# Patient Record
Sex: MALE | Race: WHITE | NOT HISPANIC OR LATINO
[De-identification: names, ages, dates, MRNs, and addresses within clinical notes are randomized per-mention and may not be internally consistent; named-entity substitution may affect disease eponyms.]

---

## 2018-02-01 PROBLEM — Z00.00 ENCOUNTER FOR PREVENTIVE HEALTH EXAMINATION: Status: ACTIVE | Noted: 2018-02-01

## 2018-02-08 ENCOUNTER — APPOINTMENT (OUTPATIENT)
Dept: CARDIOLOGY | Facility: CLINIC | Age: 58
End: 2018-02-08
Payer: COMMERCIAL

## 2018-02-08 ENCOUNTER — NON-APPOINTMENT (OUTPATIENT)
Age: 58
End: 2018-02-08

## 2018-02-08 VITALS
BODY MASS INDEX: 32.13 KG/M2 | SYSTOLIC BLOOD PRESSURE: 148 MMHG | OXYGEN SATURATION: 98 % | HEART RATE: 85 BPM | WEIGHT: 212 LBS | DIASTOLIC BLOOD PRESSURE: 90 MMHG | HEIGHT: 68 IN

## 2018-02-08 DIAGNOSIS — Z87.898 PERSONAL HISTORY OF OTHER SPECIFIED CONDITIONS: ICD-10-CM

## 2018-02-08 DIAGNOSIS — Z82.49 FAMILY HISTORY OF ISCHEMIC HEART DISEASE AND OTHER DISEASES OF THE CIRCULATORY SYSTEM: ICD-10-CM

## 2018-02-08 DIAGNOSIS — Z87.891 PERSONAL HISTORY OF NICOTINE DEPENDENCE: ICD-10-CM

## 2018-02-08 PROCEDURE — 99204 OFFICE O/P NEW MOD 45 MIN: CPT

## 2018-02-08 PROCEDURE — 93306 TTE W/DOPPLER COMPLETE: CPT

## 2018-02-08 PROCEDURE — 93000 ELECTROCARDIOGRAM COMPLETE: CPT

## 2018-02-08 RX ORDER — ROSUVASTATIN CALCIUM 10 MG/1
10 TABLET, FILM COATED ORAL DAILY
Refills: 0 | Status: ACTIVE | COMMUNITY

## 2018-03-01 ENCOUNTER — APPOINTMENT (OUTPATIENT)
Dept: OPHTHALMOLOGY | Facility: CLINIC | Age: 58
End: 2018-03-01
Payer: COMMERCIAL

## 2018-03-01 ENCOUNTER — APPOINTMENT (OUTPATIENT)
Dept: CARDIOLOGY | Facility: CLINIC | Age: 58
End: 2018-03-01
Payer: COMMERCIAL

## 2018-03-01 VITALS
HEART RATE: 96 BPM | HEIGHT: 68 IN | OXYGEN SATURATION: 98 % | DIASTOLIC BLOOD PRESSURE: 84 MMHG | BODY MASS INDEX: 31.98 KG/M2 | WEIGHT: 211 LBS | SYSTOLIC BLOOD PRESSURE: 128 MMHG

## 2018-03-01 PROCEDURE — 92004 COMPRE OPH EXAM NEW PT 1/>: CPT

## 2018-03-01 PROCEDURE — 99214 OFFICE O/P EST MOD 30 MIN: CPT

## 2018-03-19 ENCOUNTER — OUTPATIENT (OUTPATIENT)
Dept: OUTPATIENT SERVICES | Facility: HOSPITAL | Age: 58
LOS: 1 days | End: 2018-03-19
Payer: COMMERCIAL

## 2018-03-19 ENCOUNTER — APPOINTMENT (OUTPATIENT)
Dept: CARDIOLOGY | Facility: CLINIC | Age: 58
End: 2018-03-19

## 2018-03-19 DIAGNOSIS — Z00.8 ENCOUNTER FOR OTHER GENERAL EXAMINATION: ICD-10-CM

## 2018-03-19 PROCEDURE — 93351 STRESS TTE COMPLETE: CPT

## 2018-03-19 PROCEDURE — 93325 DOPPLER ECHO COLOR FLOW MAPG: CPT

## 2018-03-19 PROCEDURE — 93325 DOPPLER ECHO COLOR FLOW MAPG: CPT | Mod: 26

## 2018-03-19 PROCEDURE — 93016 CV STRESS TEST SUPVJ ONLY: CPT

## 2018-03-19 PROCEDURE — 93017 CV STRESS TEST TRACING ONLY: CPT

## 2018-03-19 PROCEDURE — 93320 DOPPLER ECHO COMPLETE: CPT | Mod: 26

## 2018-03-19 PROCEDURE — 93320 DOPPLER ECHO COMPLETE: CPT

## 2018-03-19 PROCEDURE — 93018 CV STRESS TEST I&R ONLY: CPT

## 2018-03-19 PROCEDURE — 93350 STRESS TTE ONLY: CPT | Mod: 26

## 2018-04-17 ENCOUNTER — MEDICATION RENEWAL (OUTPATIENT)
Age: 58
End: 2018-04-17

## 2018-09-06 ENCOUNTER — APPOINTMENT (OUTPATIENT)
Dept: CARDIOLOGY | Facility: CLINIC | Age: 58
End: 2018-09-06
Payer: COMMERCIAL

## 2018-09-06 ENCOUNTER — NON-APPOINTMENT (OUTPATIENT)
Age: 58
End: 2018-09-06

## 2018-09-06 VITALS
SYSTOLIC BLOOD PRESSURE: 150 MMHG | DIASTOLIC BLOOD PRESSURE: 91 MMHG | HEART RATE: 95 BPM | OXYGEN SATURATION: 98 % | WEIGHT: 212.25 LBS | BODY MASS INDEX: 32.17 KG/M2 | HEIGHT: 68 IN

## 2018-09-06 PROCEDURE — 93000 ELECTROCARDIOGRAM COMPLETE: CPT

## 2018-09-06 PROCEDURE — 99214 OFFICE O/P EST MOD 30 MIN: CPT

## 2018-10-04 ENCOUNTER — MEDICATION RENEWAL (OUTPATIENT)
Age: 58
End: 2018-10-04

## 2018-10-09 ENCOUNTER — APPOINTMENT (OUTPATIENT)
Dept: CARDIOLOGY | Facility: CLINIC | Age: 58
End: 2018-10-09
Payer: COMMERCIAL

## 2018-10-09 VITALS
OXYGEN SATURATION: 99 % | HEART RATE: 77 BPM | BODY MASS INDEX: 32.17 KG/M2 | HEIGHT: 68 IN | SYSTOLIC BLOOD PRESSURE: 132 MMHG | WEIGHT: 212.25 LBS | DIASTOLIC BLOOD PRESSURE: 82 MMHG

## 2018-10-09 PROCEDURE — 99213 OFFICE O/P EST LOW 20 MIN: CPT

## 2019-02-06 ENCOUNTER — APPOINTMENT (OUTPATIENT)
Dept: CARDIOLOGY | Facility: CLINIC | Age: 59
End: 2019-02-06
Payer: COMMERCIAL

## 2019-02-06 ENCOUNTER — NON-APPOINTMENT (OUTPATIENT)
Age: 59
End: 2019-02-06

## 2019-02-06 VITALS
OXYGEN SATURATION: 94 % | BODY MASS INDEX: 32.58 KG/M2 | WEIGHT: 215 LBS | HEIGHT: 68 IN | SYSTOLIC BLOOD PRESSURE: 150 MMHG | DIASTOLIC BLOOD PRESSURE: 80 MMHG | HEART RATE: 79 BPM

## 2019-02-06 VITALS — SYSTOLIC BLOOD PRESSURE: 146 MMHG | DIASTOLIC BLOOD PRESSURE: 80 MMHG

## 2019-02-06 PROCEDURE — 99214 OFFICE O/P EST MOD 30 MIN: CPT

## 2019-02-06 PROCEDURE — 93000 ELECTROCARDIOGRAM COMPLETE: CPT

## 2019-02-06 NOTE — REVIEW OF SYSTEMS
[Negative] : Endocrine [Shortness Of Breath] : no shortness of breath [Chest Pain] : no chest pain [Lower Ext Edema] : no extremity edema [Palpitations] : no palpitations [Easy Bleeding] : no tendency for easy bleeding [Easy Bruising] : no tendency for easy bruising

## 2019-02-06 NOTE — HISTORY OF PRESENT ILLNESS
[FreeTextEntry1] : Ricardo Lucia is a 58 year old male with history of hypertension and hypercholesterolemia comes for follow up visit. Denies any chest pain or palpitations. No shortness of breath on exertion. Working long hours. Compliant to medications and diet.

## 2019-02-06 NOTE — PHYSICAL EXAM
[General Appearance - Well Developed] : well developed [Normal Appearance] : normal appearance [Well Groomed] : well groomed [General Appearance - Well Nourished] : well nourished [No Deformities] : no deformities [General Appearance - In No Acute Distress] : no acute distress [Normal Conjunctiva] : the conjunctiva exhibited no abnormalities [Eyelids - No Xanthelasma] : the eyelids demonstrated no xanthelasmas [Normal Oral Mucosa] : normal oral mucosa [No Oral Pallor] : no oral pallor [No Oral Cyanosis] : no oral cyanosis [Auscultation Breath Sounds / Voice Sounds] : lungs were clear to auscultation bilaterally [Heart Rate And Rhythm] : heart rate and rhythm were normal [Heart Sounds] : normal S1 and S2 [Murmurs] : no murmurs present [Arterial Pulses Normal] : the arterial pulses were normal [Edema] : no peripheral edema present [Bowel Sounds] : normal bowel sounds [Abdomen Soft] : soft [Abdomen Tenderness] : non-tender [Abnormal Walk] : normal gait [Nail Clubbing] : no clubbing of the fingernails [Cyanosis, Localized] : no localized cyanosis [Skin Color & Pigmentation] : normal skin color and pigmentation [Skin Turgor] : normal skin turgor [] : no rash [Oriented To Time, Place, And Person] : oriented to person, place, and time [Impaired Insight] : insight and judgment were intact [No Anxiety] : not feeling anxious [FreeTextEntry1] : No JVD

## 2019-03-06 ENCOUNTER — MEDICATION RENEWAL (OUTPATIENT)
Age: 59
End: 2019-03-06

## 2019-08-07 ENCOUNTER — APPOINTMENT (OUTPATIENT)
Dept: CARDIOLOGY | Facility: CLINIC | Age: 59
End: 2019-08-07
Payer: COMMERCIAL

## 2019-08-07 ENCOUNTER — NON-APPOINTMENT (OUTPATIENT)
Age: 59
End: 2019-08-07

## 2019-08-07 VITALS
HEART RATE: 86 BPM | BODY MASS INDEX: 31.83 KG/M2 | WEIGHT: 210 LBS | OXYGEN SATURATION: 96 % | DIASTOLIC BLOOD PRESSURE: 80 MMHG | HEIGHT: 68 IN | SYSTOLIC BLOOD PRESSURE: 120 MMHG

## 2019-08-07 PROCEDURE — 99214 OFFICE O/P EST MOD 30 MIN: CPT

## 2019-08-07 PROCEDURE — 93000 ELECTROCARDIOGRAM COMPLETE: CPT

## 2019-08-07 NOTE — HISTORY OF PRESENT ILLNESS
[FreeTextEntry1] : Ricardo Lucia is a 58 year old male with history of hypertension and hypercholesterolemia comes for follow up visit. No chest pains or shortness of breath on exertion. No palpitations. Compliant to medications and diet. Follows up with PCP.

## 2019-08-07 NOTE — DISCUSSION/SUMMARY
[FreeTextEntry1] : In a summary Mr. Islasdana Ricardo is a middle aged male with hypertension, controlled. Continue current medications and 2 gm sodium diet. Hypercholesterolemia, not taking statins as he does not feel good. Explained him to follow up with PCP, get Lipid profile . Healthy lifestyle. Follow up in 6 months.

## 2019-08-07 NOTE — PHYSICAL EXAM
[General Appearance - Well Developed] : well developed [Normal Appearance] : normal appearance [General Appearance - Well Nourished] : well nourished [Well Groomed] : well groomed [No Deformities] : no deformities [General Appearance - In No Acute Distress] : no acute distress [Eyelids - No Xanthelasma] : the eyelids demonstrated no xanthelasmas [Normal Conjunctiva] : the conjunctiva exhibited no abnormalities [Normal Oral Mucosa] : normal oral mucosa [No Oral Cyanosis] : no oral cyanosis [No Oral Pallor] : no oral pallor [Auscultation Breath Sounds / Voice Sounds] : lungs were clear to auscultation bilaterally [Heart Rate And Rhythm] : heart rate and rhythm were normal [Heart Sounds] : normal S1 and S2 [Murmurs] : no murmurs present [Edema] : no peripheral edema present [Arterial Pulses Normal] : the arterial pulses were normal [Bowel Sounds] : normal bowel sounds [Abdomen Soft] : soft [Abdomen Tenderness] : non-tender [Nail Clubbing] : no clubbing of the fingernails [Abnormal Walk] : normal gait [Cyanosis, Localized] : no localized cyanosis [Skin Color & Pigmentation] : normal skin color and pigmentation [Skin Turgor] : normal skin turgor [] : no rash [Impaired Insight] : insight and judgment were intact [Oriented To Time, Place, And Person] : oriented to person, place, and time [No Anxiety] : not feeling anxious [FreeTextEntry1] : No JVD

## 2019-08-09 ENCOUNTER — MEDICATION RENEWAL (OUTPATIENT)
Age: 59
End: 2019-08-09

## 2020-02-05 ENCOUNTER — APPOINTMENT (OUTPATIENT)
Dept: CARDIOLOGY | Facility: CLINIC | Age: 60
End: 2020-02-05
Payer: COMMERCIAL

## 2020-02-05 ENCOUNTER — NON-APPOINTMENT (OUTPATIENT)
Age: 60
End: 2020-02-05

## 2020-02-05 VITALS
BODY MASS INDEX: 32.28 KG/M2 | SYSTOLIC BLOOD PRESSURE: 130 MMHG | HEART RATE: 81 BPM | OXYGEN SATURATION: 97 % | WEIGHT: 213 LBS | HEIGHT: 68 IN | DIASTOLIC BLOOD PRESSURE: 72 MMHG

## 2020-02-05 PROCEDURE — 93306 TTE W/DOPPLER COMPLETE: CPT

## 2020-02-05 PROCEDURE — 99214 OFFICE O/P EST MOD 30 MIN: CPT

## 2020-02-05 PROCEDURE — 93000 ELECTROCARDIOGRAM COMPLETE: CPT

## 2020-02-05 NOTE — PHYSICAL EXAM
[Normal Appearance] : normal appearance [General Appearance - Well Developed] : well developed [Well Groomed] : well groomed [General Appearance - Well Nourished] : well nourished [No Deformities] : no deformities [General Appearance - In No Acute Distress] : no acute distress [Normal Conjunctiva] : the conjunctiva exhibited no abnormalities [Eyelids - No Xanthelasma] : the eyelids demonstrated no xanthelasmas [No Oral Cyanosis] : no oral cyanosis [No Oral Pallor] : no oral pallor [Normal Oral Mucosa] : normal oral mucosa [Auscultation Breath Sounds / Voice Sounds] : lungs were clear to auscultation bilaterally [Murmurs] : no murmurs present [Heart Sounds] : normal S1 and S2 [Heart Rate And Rhythm] : heart rate and rhythm were normal [Abdomen Soft] : soft [Bowel Sounds] : normal bowel sounds [Arterial Pulses Normal] : the arterial pulses were normal [Edema] : no peripheral edema present [Abdomen Tenderness] : non-tender [Abnormal Walk] : normal gait [Nail Clubbing] : no clubbing of the fingernails [Cyanosis, Localized] : no localized cyanosis [Skin Turgor] : normal skin turgor [Skin Color & Pigmentation] : normal skin color and pigmentation [] : no rash [Oriented To Time, Place, And Person] : oriented to person, place, and time [Impaired Insight] : insight and judgment were intact [No Anxiety] : not feeling anxious [FreeTextEntry1] : No JVD

## 2020-02-05 NOTE — DISCUSSION/SUMMARY
[FreeTextEntry1] : In a summary Ricardo Lucia is a middle aged male with hypertension, controlled. Continue current medications and 2 gm sodium diet. Echo done showed normal LV systolic function. Hypercholesterolemia, on statins and low cholesterol diet. Healthy lifestyle. Follow up in 6 months.

## 2020-02-05 NOTE — HISTORY OF PRESENT ILLNESS
[FreeTextEntry1] : Ricardo Lucia is a 59 year old male with history of hypertension and hypercholesterolemia comes for follow up visit. Denies any chest pain or palpitations. No shortness of breath on exertion. Compliant to medications and diet. Physically active. Follows up with PCP.

## 2020-08-06 ENCOUNTER — APPOINTMENT (OUTPATIENT)
Dept: CARDIOLOGY | Facility: CLINIC | Age: 60
End: 2020-08-06
Payer: COMMERCIAL

## 2020-08-06 ENCOUNTER — NON-APPOINTMENT (OUTPATIENT)
Age: 60
End: 2020-08-06

## 2020-08-06 VITALS
HEIGHT: 68 IN | SYSTOLIC BLOOD PRESSURE: 132 MMHG | WEIGHT: 207 LBS | HEART RATE: 87 BPM | OXYGEN SATURATION: 98 % | DIASTOLIC BLOOD PRESSURE: 82 MMHG | BODY MASS INDEX: 31.37 KG/M2

## 2020-08-06 PROCEDURE — 99214 OFFICE O/P EST MOD 30 MIN: CPT

## 2020-08-06 PROCEDURE — 93000 ELECTROCARDIOGRAM COMPLETE: CPT

## 2020-08-06 RX ORDER — PNV NO.95/FERROUS FUM/FOLIC AC 28MG-0.8MG
325 TABLET ORAL DAILY
Refills: 0 | Status: ACTIVE | COMMUNITY

## 2020-08-06 NOTE — HISTORY OF PRESENT ILLNESS
[FreeTextEntry1] : Ricardo Lucia is a 59 year old male with history of hypertension and hypercholesterolemia comes for follow up visit. Denies any chest pain or palpitations. No shortness of breath on exertion. Compliant to medications and diet. Follows up with PCP. Physically active.

## 2020-08-06 NOTE — DISCUSSION/SUMMARY
[FreeTextEntry1] : In a summary Mr. Islasdana Ricardo is a middle aged male with hypertension, controlled. Continue current medications and low cholesterol diet. Hypercholesterolemia, on statins and low cholesterol diet. LDL goal less than 130 g/dL. Continue healthy lifestyle. Follow up in 6 months.

## 2020-08-06 NOTE — PHYSICAL EXAM
[General Appearance - Well Developed] : well developed [Normal Appearance] : normal appearance [Well Groomed] : well groomed [General Appearance - Well Nourished] : well nourished [No Deformities] : no deformities [General Appearance - In No Acute Distress] : no acute distress [Normal Conjunctiva] : the conjunctiva exhibited no abnormalities [Normal Oral Mucosa] : normal oral mucosa [Eyelids - No Xanthelasma] : the eyelids demonstrated no xanthelasmas [No Oral Pallor] : no oral pallor [No Oral Cyanosis] : no oral cyanosis [Auscultation Breath Sounds / Voice Sounds] : lungs were clear to auscultation bilaterally [Heart Sounds] : normal S1 and S2 [Heart Rate And Rhythm] : heart rate and rhythm were normal [Murmurs] : no murmurs present [Arterial Pulses Normal] : the arterial pulses were normal [Bowel Sounds] : normal bowel sounds [Abdomen Soft] : soft [Edema] : no peripheral edema present [Abdomen Tenderness] : non-tender [Abnormal Walk] : normal gait [Cyanosis, Localized] : no localized cyanosis [Nail Clubbing] : no clubbing of the fingernails [Skin Turgor] : normal skin turgor [] : no rash [Skin Color & Pigmentation] : normal skin color and pigmentation [Impaired Insight] : insight and judgment were intact [Oriented To Time, Place, And Person] : oriented to person, place, and time [No Anxiety] : not feeling anxious [FreeTextEntry1] : No JVD

## 2021-02-08 ENCOUNTER — NON-APPOINTMENT (OUTPATIENT)
Age: 61
End: 2021-02-08

## 2021-02-08 ENCOUNTER — APPOINTMENT (OUTPATIENT)
Dept: CARDIOLOGY | Facility: CLINIC | Age: 61
End: 2021-02-08
Payer: COMMERCIAL

## 2021-02-08 VITALS
WEIGHT: 216 LBS | DIASTOLIC BLOOD PRESSURE: 86 MMHG | SYSTOLIC BLOOD PRESSURE: 138 MMHG | HEART RATE: 97 BPM | OXYGEN SATURATION: 97 % | BODY MASS INDEX: 32.74 KG/M2 | TEMPERATURE: 98.5 F | HEIGHT: 68 IN

## 2021-02-08 PROCEDURE — 93000 ELECTROCARDIOGRAM COMPLETE: CPT

## 2021-02-08 PROCEDURE — 99072 ADDL SUPL MATRL&STAF TM PHE: CPT

## 2021-02-08 PROCEDURE — 99214 OFFICE O/P EST MOD 30 MIN: CPT

## 2021-02-08 PROCEDURE — 93306 TTE W/DOPPLER COMPLETE: CPT

## 2021-02-08 NOTE — DISCUSSION/SUMMARY
[FreeTextEntry1] : In a summary Ricardo Lucia is a middle aged male with hypertension, controlled. Continue current medications and 2 gm sodium diet. Hypertension, and heart murmur, echo done showed normal LV systolic function and mild TR. Hypercholesterolemia, on statins and low cholesterol diet. LDL goal less than 130 g/dL. Sinus tachycardia may be secondary to pain. Monitor heart rate. Healthy lifestyle. Explained Mr. Conde in detail. Follow up in 6 months.

## 2021-02-08 NOTE — PHYSICAL EXAM
[General Appearance - Well Developed] : well developed [Normal Appearance] : normal appearance [Well Groomed] : well groomed [No Deformities] : no deformities [General Appearance - Well Nourished] : well nourished [General Appearance - In No Acute Distress] : no acute distress [Normal Conjunctiva] : the conjunctiva exhibited no abnormalities [Eyelids - No Xanthelasma] : the eyelids demonstrated no xanthelasmas [Normal Oral Mucosa] : normal oral mucosa [No Oral Pallor] : no oral pallor [No Oral Cyanosis] : no oral cyanosis [Auscultation Breath Sounds / Voice Sounds] : lungs were clear to auscultation bilaterally [Heart Rate And Rhythm] : heart rate and rhythm were normal [Heart Sounds] : normal S1 and S2 [Arterial Pulses Normal] : the arterial pulses were normal [Edema] : no peripheral edema present [Bowel Sounds] : normal bowel sounds [Abdomen Soft] : soft [Abdomen Tenderness] : non-tender [Abnormal Walk] : normal gait [Nail Clubbing] : no clubbing of the fingernails [Cyanosis, Localized] : no localized cyanosis [Skin Color & Pigmentation] : normal skin color and pigmentation [] : no rash [Skin Turgor] : normal skin turgor [Oriented To Time, Place, And Person] : oriented to person, place, and time [Impaired Insight] : insight and judgment were intact [No Anxiety] : not feeling anxious [FreeTextEntry1] : 2/6 PSM at left sternal border.

## 2021-02-08 NOTE — HISTORY OF PRESENT ILLNESS
[FreeTextEntry1] : Ricardo Lucia is a 60 year old male with history of hypertension and hypercholesterolemia comes for follow up visit. Denies any chest pain on exertion or palpitations. No shortness of breath on exertion. 10 days ago fell on his right side while playing Tennis and still have right sided mild chest pain though improving. Compliant to medications and diet. Physically not much active because of pandemic.

## 2021-02-08 NOTE — REASON FOR VISIT
[Follow-Up - Clinic] : a clinic follow-up of [Hyperlipidemia] : hyperlipidemia [Hypertension] : hypertension [FreeTextEntry1] : sinus tachycardia.

## 2021-08-11 ENCOUNTER — NON-APPOINTMENT (OUTPATIENT)
Age: 61
End: 2021-08-11

## 2021-08-11 ENCOUNTER — APPOINTMENT (OUTPATIENT)
Dept: CARDIOLOGY | Facility: CLINIC | Age: 61
End: 2021-08-11
Payer: COMMERCIAL

## 2021-08-11 VITALS
WEIGHT: 209 LBS | HEART RATE: 91 BPM | TEMPERATURE: 97.7 F | BODY MASS INDEX: 31.67 KG/M2 | DIASTOLIC BLOOD PRESSURE: 80 MMHG | OXYGEN SATURATION: 96 % | HEIGHT: 68 IN | SYSTOLIC BLOOD PRESSURE: 126 MMHG

## 2021-08-11 DIAGNOSIS — Z86.79 PERSONAL HISTORY OF OTHER DISEASES OF THE CIRCULATORY SYSTEM: ICD-10-CM

## 2021-08-11 PROCEDURE — 99214 OFFICE O/P EST MOD 30 MIN: CPT

## 2021-08-11 PROCEDURE — 93000 ELECTROCARDIOGRAM COMPLETE: CPT

## 2021-08-11 RX ORDER — TRAZODONE HYDROCHLORIDE 50 MG/1
50 TABLET ORAL
Refills: 0 | Status: ACTIVE | COMMUNITY

## 2021-08-11 NOTE — DISCUSSION/SUMMARY
[FreeTextEntry1] : In a summary  Chemoyayodana Ricardo is a middle aged male with hypertension, controlled. Continue current medications and 2 gm sodium diet. Hypercholesterolemia, on statins and low cholesterol diet. LDL goal less than 130 g/dL. Continue healthy lifestyle. Follow up in 6 months.

## 2021-08-11 NOTE — REVIEW OF SYSTEMS
[Under Stress] : under stress [Negative] : Respiratory [Blurry Vision] : no blurred vision [Dyspnea on exertion] : not dyspnea during exertion [Chest Discomfort] : no chest discomfort [Lower Ext Edema] : no extremity edema [Palpitations] : no palpitations [Orthopnea] : no orthopnea [PND] : no PND [Abdominal Pain] : no abdominal pain [Nausea] : no nausea [Vomiting] : no vomiting [Heartburn] : no heartburn [Joint Pain] : no joint pain [Rash] : no rash [Itching] : no itching [Dizziness] : no dizziness [Tremor] : no tremor was seen [Numbness (Hypoesthesia)] : no numbness [Tingling (Paresthesia)] : no tingling [Confusion] : no confusion was observed [Easy Bleeding] : no tendency for easy bleeding [Easy Bruising] : no tendency for easy bruising

## 2021-08-11 NOTE — HISTORY OF PRESENT ILLNESS
[FreeTextEntry1] : Ricardo Lucia is a 60 year old male with history of hypertension and hypercholesterolemia comes for follow up visit. Denies any chest pain or shortness of breath on exertion. No palpitations. Compliant to medications and diet. Started walking about 1 hour almost every day and feeling much better. Lost about 5 lbs.

## 2021-12-16 ENCOUNTER — APPOINTMENT (OUTPATIENT)
Dept: UROLOGY | Facility: CLINIC | Age: 61
End: 2021-12-16
Payer: COMMERCIAL

## 2021-12-16 VITALS
HEIGHT: 68 IN | SYSTOLIC BLOOD PRESSURE: 151 MMHG | WEIGHT: 185 LBS | DIASTOLIC BLOOD PRESSURE: 77 MMHG | RESPIRATION RATE: 17 BRPM | TEMPERATURE: 98 F | HEART RATE: 82 BPM | BODY MASS INDEX: 28.04 KG/M2

## 2021-12-16 DIAGNOSIS — R97.20 ELEVATED PROSTATE, SPECIFIC ANTIGEN [PSA]: ICD-10-CM

## 2021-12-16 PROCEDURE — 99204 OFFICE O/P NEW MOD 45 MIN: CPT

## 2021-12-16 PROCEDURE — 51798 US URINE CAPACITY MEASURE: CPT

## 2021-12-20 ENCOUNTER — NON-APPOINTMENT (OUTPATIENT)
Age: 61
End: 2021-12-20

## 2021-12-20 LAB
APPEARANCE: CLEAR
BACTERIA UR CULT: NORMAL
BACTERIA: NEGATIVE
BILIRUBIN URINE: NEGATIVE
BLOOD URINE: NEGATIVE
COLOR: YELLOW
GLUCOSE QUALITATIVE U: NEGATIVE
HYALINE CASTS: 0 /LPF
KETONES URINE: NEGATIVE
LEUKOCYTE ESTERASE URINE: NEGATIVE
MICROSCOPIC-UA: NORMAL
NITRITE URINE: NEGATIVE
PH URINE: 6.5
PROTEIN URINE: NORMAL
RED BLOOD CELLS URINE: 2 /HPF
SPECIFIC GRAVITY URINE: 1.03
SQUAMOUS EPITHELIAL CELLS: 1 /HPF
UROBILINOGEN URINE: ABNORMAL
WHITE BLOOD CELLS URINE: 1 /HPF

## 2021-12-22 LAB
PSA FREE FLD-MCNC: 15 %
PSA FREE SERPL-MCNC: 0.53 NG/ML
PSA SERPL-MCNC: 3.46 NG/ML

## 2022-01-05 ENCOUNTER — APPOINTMENT (OUTPATIENT)
Dept: MRI IMAGING | Facility: IMAGING CENTER | Age: 62
End: 2022-01-05

## 2022-02-09 ENCOUNTER — NON-APPOINTMENT (OUTPATIENT)
Age: 62
End: 2022-02-09

## 2022-02-09 ENCOUNTER — APPOINTMENT (OUTPATIENT)
Dept: CARDIOLOGY | Facility: CLINIC | Age: 62
End: 2022-02-09
Payer: COMMERCIAL

## 2022-02-09 VITALS
DIASTOLIC BLOOD PRESSURE: 82 MMHG | WEIGHT: 191 LBS | BODY MASS INDEX: 28.95 KG/M2 | TEMPERATURE: 98.6 F | OXYGEN SATURATION: 96 % | HEART RATE: 98 BPM | HEIGHT: 68 IN | SYSTOLIC BLOOD PRESSURE: 136 MMHG

## 2022-02-09 PROCEDURE — 99214 OFFICE O/P EST MOD 30 MIN: CPT

## 2022-02-09 PROCEDURE — 93000 ELECTROCARDIOGRAM COMPLETE: CPT

## 2022-02-09 NOTE — DISCUSSION/SUMMARY
[FreeTextEntry1] : In a summary Ricardo Lucia is a middle aged male with hypertension, controlled. Continue Amlodipine, Micardis  and 2 gm sodium diet. Hypercholesterolemia, Continue Crestor  and low cholesterol diet. LDL goal less than 130 g/dL. Chest tightness and shortness of breath on Jogging, will get Echo to assess LV systolic function and wall motion. Further plan based on Echo results.  Continue healthy lifestyle.

## 2022-02-09 NOTE — HISTORY OF PRESENT ILLNESS
[FreeTextEntry1] : Ricardo Lucia is a 61 year old male with history of hypertension and hypercholesterolemia comes for follow up visit. Complaining of mild  chest tightness along with   shortness of breath on exertion especially on jogging which is nonradiating and relieved with rest in about 5 minutes of few weeks duration. No palpitations. Compliant to medications and diet. Started walking every day.

## 2022-02-09 NOTE — REASON FOR VISIT
[Hyperlipidemia] : hyperlipidemia [Hypertension] : hypertension [Other: ____] : [unfilled] [FreeTextEntry3] : Dr. Isabel

## 2022-02-09 NOTE — REVIEW OF SYSTEMS
[Dyspnea on exertion] : dyspnea during exertion [Under Stress] : under stress [Negative] : Respiratory [Blurry Vision] : no blurred vision [Chest Discomfort] : no chest discomfort [Lower Ext Edema] : no extremity edema [Palpitations] : no palpitations [Orthopnea] : no orthopnea [PND] : no PND [Abdominal Pain] : no abdominal pain [Nausea] : no nausea [Vomiting] : no vomiting [Heartburn] : no heartburn [Joint Pain] : no joint pain [Rash] : no rash [Itching] : no itching [Dizziness] : no dizziness [Tremor] : no tremor was seen [Numbness (Hypoesthesia)] : no numbness [Tingling (Paresthesia)] : no tingling [Confusion] : no confusion was observed [Easy Bleeding] : no tendency for easy bleeding [Easy Bruising] : no tendency for easy bruising [FreeTextEntry5] : chest tightness.

## 2022-02-23 ENCOUNTER — APPOINTMENT (OUTPATIENT)
Dept: CARDIOLOGY | Facility: CLINIC | Age: 62
End: 2022-02-23
Payer: COMMERCIAL

## 2022-02-23 PROCEDURE — 93306 TTE W/DOPPLER COMPLETE: CPT

## 2022-03-03 ENCOUNTER — APPOINTMENT (OUTPATIENT)
Dept: CARDIOLOGY | Facility: CLINIC | Age: 62
End: 2022-03-03
Payer: COMMERCIAL

## 2022-03-03 DIAGNOSIS — R07.89 OTHER CHEST PAIN: ICD-10-CM

## 2022-03-03 PROCEDURE — 93351 STRESS TTE COMPLETE: CPT

## 2022-03-03 RX ORDER — ASPIRIN ENTERIC COATED TABLETS 81 MG 81 MG/1
81 TABLET, DELAYED RELEASE ORAL DAILY
Refills: 0 | Status: ACTIVE | COMMUNITY

## 2022-03-09 ENCOUNTER — OUTPATIENT (OUTPATIENT)
Dept: OUTPATIENT SERVICES | Facility: HOSPITAL | Age: 62
LOS: 1 days | Discharge: HOME | End: 2022-03-09
Payer: COMMERCIAL

## 2022-03-09 LAB
ANION GAP SERPL CALC-SCNC: 10 MMOL/L — SIGNIFICANT CHANGE UP (ref 7–14)
BUN SERPL-MCNC: 17 MG/DL — SIGNIFICANT CHANGE UP (ref 7–23)
CALCIUM SERPL-MCNC: 8.9 MG/DL — SIGNIFICANT CHANGE UP (ref 8.4–10.5)
CHLORIDE SERPL-SCNC: 104 MMOL/L — SIGNIFICANT CHANGE UP (ref 98–107)
CO2 SERPL-SCNC: 27 MMOL/L — SIGNIFICANT CHANGE UP (ref 22–31)
CREAT SERPL-MCNC: 0.74 MG/DL — SIGNIFICANT CHANGE UP (ref 0.5–1.3)
EGFR: 104 ML/MIN/1.73M2 — SIGNIFICANT CHANGE UP
GLUCOSE SERPL-MCNC: 108 MG/DL — HIGH (ref 70–99)
HCT VFR BLD CALC: 40 % — SIGNIFICANT CHANGE UP (ref 39–50)
HGB BLD-MCNC: 13.8 G/DL — SIGNIFICANT CHANGE UP (ref 13–17)
MCHC RBC-ENTMCNC: 30.2 PG — SIGNIFICANT CHANGE UP (ref 27–34)
MCHC RBC-ENTMCNC: 34.5 GM/DL — SIGNIFICANT CHANGE UP (ref 32–36)
MCV RBC AUTO: 87.5 FL — SIGNIFICANT CHANGE UP (ref 80–100)
NRBC # BLD: 0 /100 WBCS — SIGNIFICANT CHANGE UP
NRBC # FLD: 0 K/UL — SIGNIFICANT CHANGE UP
PLATELET # BLD AUTO: 250 K/UL — SIGNIFICANT CHANGE UP (ref 150–400)
POTASSIUM SERPL-MCNC: 4 MMOL/L — SIGNIFICANT CHANGE UP (ref 3.5–5.3)
POTASSIUM SERPL-SCNC: 4 MMOL/L — SIGNIFICANT CHANGE UP (ref 3.5–5.3)
RBC # BLD: 4.57 M/UL — SIGNIFICANT CHANGE UP (ref 4.2–5.8)
RBC # FLD: 12.5 % — SIGNIFICANT CHANGE UP (ref 10.3–14.5)
SODIUM SERPL-SCNC: 141 MMOL/L — SIGNIFICANT CHANGE UP (ref 135–145)
WBC # BLD: 8.15 K/UL — SIGNIFICANT CHANGE UP (ref 3.8–10.5)
WBC # FLD AUTO: 8.15 K/UL — SIGNIFICANT CHANGE UP (ref 3.8–10.5)

## 2022-03-09 PROCEDURE — 93454 CORONARY ARTERY ANGIO S&I: CPT | Mod: 26

## 2022-03-09 PROCEDURE — 93571 IV DOP VEL&/PRESS C FLO 1ST: CPT | Mod: 26,LM

## 2022-03-09 PROCEDURE — 93010 ELECTROCARDIOGRAM REPORT: CPT

## 2022-03-09 PROCEDURE — 99152 MOD SED SAME PHYS/QHP 5/>YRS: CPT

## 2022-03-09 RX ORDER — METOPROLOL TARTRATE 50 MG
1 TABLET ORAL
Qty: 0 | Refills: 0 | DISCHARGE

## 2022-03-09 RX ORDER — ISOSORBIDE MONONITRATE 60 MG/1
1 TABLET, EXTENDED RELEASE ORAL
Qty: 30 | Refills: 0
Start: 2022-03-09 | End: 2022-04-07

## 2022-03-09 RX ORDER — SODIUM CHLORIDE 9 MG/ML
3 INJECTION INTRAMUSCULAR; INTRAVENOUS; SUBCUTANEOUS EVERY 8 HOURS
Refills: 0 | Status: DISCONTINUED | OUTPATIENT
Start: 2022-03-09 | End: 2022-03-23

## 2022-03-09 RX ADMIN — SODIUM CHLORIDE 3 MILLILITER(S): 9 INJECTION INTRAMUSCULAR; INTRAVENOUS; SUBCUTANEOUS at 11:11

## 2022-03-09 NOTE — H&P CARDIOLOGY - NSICDXFAMILYHX_GEN_ALL_CORE_FT
FAMILY HISTORY:  Father  Still living? Yes, Estimated age: Age Unknown  Family history of CABG, Age at diagnosis: Age Unknown    Grandparent  Still living? No  FH: heart attack, Age at diagnosis: Age Unknown

## 2022-03-09 NOTE — H&P CARDIOLOGY - HISTORY OF PRESENT ILLNESS
67 year old male with HTN,  hyperlipidemia who presented to her Cardiologist complaining of   In light of patients cardiac risk factors, symptoms and abnormal noninvasive test findings there is high suspicion for CAD. Patient is now referred to [facility] for a cardiac catheterization with possible PTCA/stent.     COVID PCR not detected 3/6/2022 67 year old male with HTN,  hyperlipidemia who presented to his Cardiologist complaining of chest pain with exertion.  Patient states that he has been increased his exercise with 4mile walks in the mornings, however, this winter he noted a decrease in exercise tolerance with chest tightness during his walk, relieved without intervention. Denies dizziness, syncope, edema, orthopnea, PND. Underwent a stress test with reported abnormal results.   In light of patients cardiac risk factors, symptoms and abnormal noninvasive test findings there is high suspicion for CAD. Patient is now referred to [facility] for a cardiac catheterization with possible PTCA/stent.     COVID PCR not detected 3/6/2022

## 2022-03-10 PROBLEM — I10 ESSENTIAL (PRIMARY) HYPERTENSION: Chronic | Status: ACTIVE | Noted: 2022-03-09

## 2022-03-10 PROBLEM — R01.1 HEART MURMUR: Status: ACTIVE | Noted: 2021-02-08

## 2022-03-11 ENCOUNTER — APPOINTMENT (OUTPATIENT)
Dept: CARDIOTHORACIC SURGERY | Facility: CLINIC | Age: 62
End: 2022-03-11
Payer: COMMERCIAL

## 2022-03-11 VITALS
HEART RATE: 82 BPM | TEMPERATURE: 99.3 F | RESPIRATION RATE: 16 BRPM | OXYGEN SATURATION: 97 % | HEIGHT: 68 IN | DIASTOLIC BLOOD PRESSURE: 67 MMHG | SYSTOLIC BLOOD PRESSURE: 137 MMHG

## 2022-03-11 VITALS — BODY MASS INDEX: 28.43 KG/M2 | WEIGHT: 187 LBS

## 2022-03-11 DIAGNOSIS — R01.1 CARDIAC MURMUR, UNSPECIFIED: ICD-10-CM

## 2022-03-11 DIAGNOSIS — Z78.9 OTHER SPECIFIED HEALTH STATUS: ICD-10-CM

## 2022-03-11 DIAGNOSIS — Z82.49 FAMILY HISTORY OF ISCHEMIC HEART DISEASE AND OTHER DISEASES OF THE CIRCULATORY SYSTEM: ICD-10-CM

## 2022-03-11 PROCEDURE — 99204 OFFICE O/P NEW MOD 45 MIN: CPT

## 2022-03-11 RX ORDER — CHROMIUM 200 MCG
TABLET ORAL DAILY
Refills: 0 | Status: ACTIVE | COMMUNITY

## 2022-03-11 RX ORDER — TAMSULOSIN HYDROCHLORIDE 0.4 MG/1
0.4 CAPSULE ORAL
Qty: 30 | Refills: 3 | Status: COMPLETED | COMMUNITY
Start: 2021-12-16 | End: 2022-03-11

## 2022-03-15 ENCOUNTER — OUTPATIENT (OUTPATIENT)
Dept: OUTPATIENT SERVICES | Facility: HOSPITAL | Age: 62
LOS: 1 days | End: 2022-03-15
Payer: COMMERCIAL

## 2022-03-15 VITALS
WEIGHT: 188.05 LBS | DIASTOLIC BLOOD PRESSURE: 79 MMHG | HEIGHT: 68 IN | OXYGEN SATURATION: 97 % | SYSTOLIC BLOOD PRESSURE: 147 MMHG | HEART RATE: 79 BPM | TEMPERATURE: 99 F | RESPIRATION RATE: 18 BRPM

## 2022-03-15 DIAGNOSIS — I25.10 ATHEROSCLEROTIC HEART DISEASE OF NATIVE CORONARY ARTERY WITHOUT ANGINA PECTORIS: ICD-10-CM

## 2022-03-15 DIAGNOSIS — E78.5 HYPERLIPIDEMIA, UNSPECIFIED: ICD-10-CM

## 2022-03-15 DIAGNOSIS — Z11.52 ENCOUNTER FOR SCREENING FOR COVID-19: ICD-10-CM

## 2022-03-15 DIAGNOSIS — Z29.9 ENCOUNTER FOR PROPHYLACTIC MEASURES, UNSPECIFIED: ICD-10-CM

## 2022-03-15 DIAGNOSIS — I10 ESSENTIAL (PRIMARY) HYPERTENSION: ICD-10-CM

## 2022-03-15 DIAGNOSIS — Z01.818 ENCOUNTER FOR OTHER PREPROCEDURAL EXAMINATION: ICD-10-CM

## 2022-03-15 LAB
A1C WITH ESTIMATED AVERAGE GLUCOSE RESULT: 5.4 % — SIGNIFICANT CHANGE UP (ref 4–5.6)
ANION GAP SERPL CALC-SCNC: 12 MMOL/L — SIGNIFICANT CHANGE UP (ref 5–17)
BLD GP AB SCN SERPL QL: NEGATIVE — SIGNIFICANT CHANGE UP
BUN SERPL-MCNC: 15 MG/DL — SIGNIFICANT CHANGE UP (ref 7–23)
CALCIUM SERPL-MCNC: 9.5 MG/DL — SIGNIFICANT CHANGE UP (ref 8.4–10.5)
CHLORIDE SERPL-SCNC: 103 MMOL/L — SIGNIFICANT CHANGE UP (ref 96–108)
CO2 SERPL-SCNC: 27 MMOL/L — SIGNIFICANT CHANGE UP (ref 22–31)
CREAT SERPL-MCNC: 0.76 MG/DL — SIGNIFICANT CHANGE UP (ref 0.5–1.3)
EGFR: 103 ML/MIN/1.73M2 — SIGNIFICANT CHANGE UP
ESTIMATED AVERAGE GLUCOSE: 108 MG/DL — SIGNIFICANT CHANGE UP (ref 68–114)
GLUCOSE SERPL-MCNC: 88 MG/DL — SIGNIFICANT CHANGE UP (ref 70–99)
POTASSIUM SERPL-MCNC: 4.2 MMOL/L — SIGNIFICANT CHANGE UP (ref 3.5–5.3)
POTASSIUM SERPL-SCNC: 4.2 MMOL/L — SIGNIFICANT CHANGE UP (ref 3.5–5.3)
RH IG SCN BLD-IMP: POSITIVE — SIGNIFICANT CHANGE UP
SARS-COV-2 RNA SPEC QL NAA+PROBE: SIGNIFICANT CHANGE UP
SODIUM SERPL-SCNC: 142 MMOL/L — SIGNIFICANT CHANGE UP (ref 135–145)

## 2022-03-15 PROCEDURE — U0005: CPT

## 2022-03-15 PROCEDURE — 71046 X-RAY EXAM CHEST 2 VIEWS: CPT | Mod: 26

## 2022-03-15 PROCEDURE — C9803: CPT

## 2022-03-15 PROCEDURE — 93880 EXTRACRANIAL BILAT STUDY: CPT | Mod: 26

## 2022-03-15 PROCEDURE — U0003: CPT

## 2022-03-15 RX ORDER — CEFUROXIME AXETIL 250 MG
1500 TABLET ORAL ONCE
Refills: 0 | Status: COMPLETED | OUTPATIENT
Start: 2022-03-18 | End: 2022-03-18

## 2022-03-15 RX ORDER — TAMSULOSIN HYDROCHLORIDE 0.4 MG/1
1 CAPSULE ORAL
Qty: 0 | Refills: 0 | DISCHARGE

## 2022-03-15 NOTE — H&P PST ADULT - HISTORY OF PRESENT ILLNESS
This is a 62 y/o  male with PMH of HTN, HLD, c/o chest tightness on exertion, s/p cath 3/9/22 revealed + CAD , he presents today for  coronary artery bypass grafting x 3 on  3/18/22  covid swab to be done today at [facility]  denies recent travel, sick contact or covid infections

## 2022-03-15 NOTE — H&P PST ADULT - ASSESSMENT
GAELI VTE 2.0 SCORE [CLOT updated 2019]    AGE RELATED RISK FACTORS                                                       MOBILITY RELATED FACTORS  [ x] Age 41-60 years                                            (1 Point)                    [ ] Bed rest                                                        (1 Point)  [ ] Age: 61-74 years                                           (2 Points)                  [ ] Plaster cast                                                   (2 Points)  [ ] Age= 75 years                                              (3 Points)                    [ ] Bed bound for more than 72 hours                 (2 Points)    DISEASE RELATED RISK FACTORS                                               GENDER SPECIFIC FACTORS  [ ] Edema in the lower extremities                       (1 Point)              [ ] Pregnancy                                                     (1 Point)  [ ] Varicose veins                                               (1 Point)                     [ ] Post-partum < 6 weeks                                   (1 Point)             [x ] BMI > 25 Kg/m2                                            (1 Point)                     [ ] Hormonal therapy  or oral contraception          (1 Point)                 [ ] Sepsis (in the previous month)                        (1 Point)               [ ] History of pregnancy complications                 (1 point)  [ ] Pneumonia or serious lung disease                                               [ ] Unexplained or recurrent                     (1 Point)           (in the previous month)                               (1 Point)  [ ] Abnormal pulmonary function test                     (1 Point)                 SURGERY RELATED RISK FACTORS  [ ] Acute myocardial infarction                              (1 Point)               [ ]  Section                                             (1 Point)  [ ] Congestive heart failure (in the previous month)  (1 Point)      [ ] Minor surgery                                                  (1 Point)   [ ] Inflammatory bowel disease                             (1 Point)               [ ] Arthroscopic surgery                                        (2 Points)  [ ] Central venous access                                      (2 Points)                [ x] General surgery lasting more than 45 minutes (2 points)  [ ] Malignancy- Present or previous                   (2 Points)                [ ] Elective arthroplasty                                         (5 points)    [ ] Stroke (in the previous month)                          (5 Points)                                                                                                                                                           HEMATOLOGY RELATED FACTORS                                                 TRAUMA RELATED RISK FACTORS  [ ] Prior episodes of VTE                                     (3 Points)                [ ] Fracture of the hip, pelvis, or leg                       (5 Points)  [ ] Positive family history for VTE                         (3 Points)             [ ] Acute spinal cord injury (in the previous month)  (5 Points)  [ ] Prothrombin 92789 A                                     (3 Points)               [ ] Paralysis  (less than 1 month)                             (5 Points)  [ ] Factor V Leiden                                             (3 Points)                  [ ] Multiple Trauma within 1 month                        (5 Points)  [ ] Lupus anticoagulants                                     (3 Points)                                                           [ ] Anticardiolipin antibodies                               (3 Points)                                                       [ ] High homocysteine in the blood                      (3 Points)                                             [ ] Other congenital or acquired thrombophilia      (3 Points)                                                [ ] Heparin induced thrombocytopenia                  (3 Points)                                     Total Score [     4     ]

## 2022-03-16 LAB
MRSA PCR RESULT.: SIGNIFICANT CHANGE UP
S AUREUS DNA NOSE QL NAA+PROBE: SIGNIFICANT CHANGE UP

## 2022-03-16 NOTE — HISTORY OF PRESENT ILLNESS
[FreeTextEntry1] : Mr. LUCERO is a 61 year old male with  past medical history of Hypertension, Hyperlipidemia, Asthma as a child,  Heart murmur, CAD with complaints of chest tightness along with shortness of breath  especially on jogging which is non radiating and with relieved with rest after 5 minutes for 2  months and occasional  palpitations x yrs . He saw his cardiologist and  had an abnormal stress test . He subsequently underwent  on 3/9/22 Cardiac catheterization revealed he coronary circulation is right dominant. Distal left main: There was a discrete 50 % stenosis. There was LAYLA grade 3 flow through the vessel (brisk flow).  Proximal LAD: There was a tubular 30 % stenosis. Proximal circumflex: There was a discrete 60 % stenosis just after OM1. There was LAYLA grade 3 flow through the vessel (brisk flow). Mid RCA: There was a tubular 40 % stenosis in the middle third of the vessel segment.He is referred by Dr. Stefanie Morley for initial evaluation and management for CAD. Denies any syncope, dizziness , PND or pedal edema. \par Family history: Father had CABG at the age of 78, Paternal Grandfather had heart attack at the age of 78\par \par COVID: 2 doses of Pfizer, Booster dose Moderna on 11/20/21 \par

## 2022-03-16 NOTE — PHYSICAL EXAM
[General Appearance - Alert] : alert [General Appearance - In No Acute Distress] : in no acute distress [General Appearance - Well Nourished] : well nourished [General Appearance - Well Developed] : well developed [Sclera] : the sclera and conjunctiva were normal [PERRL With Normal Accommodation] : pupils were equal in size, round, and reactive to light [Outer Ear] : the ears and nose were normal in appearance [Hearing Threshold Finger Rub Not [place]] : hearing was normal [Both Tympanic Membranes Were Examined] : both tympanic membranes were normal [Neck Appearance] : the appearance of the neck was normal [] : no respiratory distress [Respiration, Rhythm And Depth] : normal respiratory rhythm and effort [Auscultation Breath Sounds / Voice Sounds] : lungs were clear to auscultation bilaterally [Apical Impulse] : the apical impulse was normal [Heart Rate And Rhythm] : heart rate was normal and rhythm regular [Heart Sounds] : normal S1 and S2 [Murmurs] : no murmurs [Examination Of The Chest] : the chest was normal in appearance [2+] : left 2+ [Breast Appearance] : normal in appearance [Bowel Sounds] : normal bowel sounds [Abdomen Soft] : soft [No CVA Tenderness] : no ~M costovertebral angle tenderness [Abnormal Walk] : normal gait [Involuntary Movements] : no involuntary movements were seen [Skin Color & Pigmentation] : normal skin color and pigmentation [Skin Turgor] : normal skin turgor [No Focal Deficits] : no focal deficits [Oriented To Time, Place, And Person] : oriented to person, place, and time [Impaired Insight] : insight and judgment were intact [Affect] : the affect was normal [Mood] : the mood was normal [Memory Recent] : recent memory was not impaired [Memory Remote] : remote memory was not impaired [FreeTextEntry1] : Deferred

## 2022-03-16 NOTE — DATA REVIEWED
[FreeTextEntry1] : 3/9/22 Cardiac catheterization revealed he coronary circulation is right dominant. Distal left main: There was a discrete 50 % stenosis. There was LAYLA grade 3 flow through the vessel (brisk flow).  Proximal LAD: There was a tubular 30 % stenosis. Proximal circumflex: There was a discrete 60 % stenosis just\par after OM1. There was LAYLA grade 3 flow through the vessel (brisk flow). Mid RCA: There was a tubular 40 % stenosis in the middle third of the vessel segment.\par \par 2/23/22 TTE revealed EF 55 to 60%, Mitral and aortic valve open adequately, Trace MR , Trace TR. \par

## 2022-03-16 NOTE — REVIEW OF SYSTEMS
[Chest Pain] : chest pain [Palpitations] : palpitations [Shortness Of Breath] : shortness of breath [SOB on Exertion] : shortness of breath during exertion [Negative] : Heme/Lymph [Lower Ext Edema] : no extremity edema [Dizziness] : no dizziness [Fainting] : no fainting

## 2022-03-16 NOTE — CONSULT LETTER
[Dear  ___] : Dear  [unfilled], [FreeTextEntry2] : Dr.Radha Morley [FreeTextEntry1] : I had the pleasure of seeing your patient, ROCKY LUCERO,in my office today. \par \par We take a multidisciplinary team approach to patient care and consider you, the referring physician, an extension of our team. We will maintain an open line of communication with you throughout your patient's treatment course. \par \par He  is being evaluated for CAD . I have reviewed all of the patient's medical records and diagnostic images at the time of his  office consultation. I have enclosed a copy for your records. \par \par 3/9/22 Cardiac catheterization revealed he coronary circulation is right dominant. Distal left main: There was a discrete 50 % stenosis. There was LAYLA grade 3 flow through the vessel (brisk flow).  Proximal LAD: There was a tubular 30 % stenosis. Proximal circumflex: There was a discrete 60 % stenosis just\par after OM1. There was LAYLA grade 3 flow through the vessel (brisk flow). Mid RCA: There was a tubular 40 % stenosis in the middle third of the vessel segment.\par \par 2/23/22 TTE revealed EF 55 to 60%, Mitral and aortic valve open adequately, Trace MR , Trace TR. \par \par I have reviewed the indications for surgery and anatomy of the heart. The patient meets criteria for surgery. I have recommended that the patient is a candidate for a  Coronary Artery Bypass grafting . \par \par Surgery is scheduled for  Coronary Artery Bypass grafting on 3/18/22 .  I will update you on his perioperative status and disposition upon discharge. \par \par I appreciate the opportunity to care for your patient at the  [facility] based at [place]. If there are any questions or concerns, please call me  at [phone].\par \par Please see my note below. \par \par Sincerely, \par \par \par \par \par Carlos Em MD\par Director\par [facility]\par Professor \par Cardiovascular & Thoracic Surgery\par [facility] [employer].\par \par \par [facility]:\par Department of Cardiovascular and Thoracic Surgery\par [address] [place] [address], [address]\par Office: [phone]\par Fax: [phone]\par \par [facility]\par  \par Department of Cardiovascular and Thoracic Surgery\par [address] [place] [address], [address]\par Phone: [phone]\par Office: [phone]\par \par \par \par \par \par

## 2022-03-16 NOTE — ASSESSMENT
[FreeTextEntry1] : Mr. LUCERO is a 61 year old male with  past medical history of Hypertension, Hyperlipidemia, Asthma as a child,  Heart murmur, CAD with complaints of chest tightness along with shortness of breath  especially on jogging which is non radiating and with relieved with rest after 5 minutes for 2  months and occasional  palpitations x yrs . He saw his cardiologist and  had an abnormal stress test . He subsequently underwent  on 3/9/22 Cardiac catheterization revealed he coronary circulation is right dominant. Distal left main: There was a discrete 50 % stenosis. There was LAYLA grade 3 flow through the vessel (brisk flow).  Proximal LAD: There was a tubular 30 % stenosis. Proximal circumflex: There was a discrete 60 % stenosis just after OM1. There was LAYLA grade 3 flow through the vessel (brisk flow). Mid RCA: There was a tubular 40 % stenosis in the middle third of the vessel segment.He is referred by Dr. Stefanie Morley for initial evaluation and management for CAD. Denies any syncope, dizziness , PND or pedal edema. \par Family history: Father had CABG at the age of 78, Paternal Grandfather had heart attack at the age of 78\par \par \par   reviewed the cardiac imaging, medical records and reports with patient and discussed the case. 3/9/22 Cardiac catheterization revealed he coronary circulation is right dominant. Distal left main: There was a discrete 50 % stenosis. There was LAYLA grade 3 flow through the vessel (brisk flow).  Proximal LAD: There was a tubular 30 % stenosis. Proximal circumflex: There was a discrete 60 % stenosis just\par after OM1. There was LAYLA grade 3 flow through the vessel (brisk flow). Mid RCA: There was a tubular 40 % stenosis in the middle third of the vessel segment.\par \par 2/23/22 TTE revealed EF 55 to 60%, Mitral and aortic valve open adequately, Trace MR , Trace TR. \par \par   discussed the risks , benefits and alternatives to surgery. Risks included but not limited to  bleeding , stroke, Myocardial Infarction, kidney problems,Blood transfusion ,permanent  pacemaker implantation,  infections and death.   quoted a (1 - 2% ) operative mortality and complication risks.   also discussed the various approaches in detail.   feel that the patient will benefit and is a candidate for a Coronary Artery Bypass grafting . All questions and concerns were addressed and patient agrees to proceed with  surgery on 3/18/22 .\par \par \par Plan:\par 1) Coronary Artery Bypass grafting on 3/18/22 \par 2) Stop Aspirin today \par 3) PST on 3/15/22\par \par \par

## 2022-03-18 ENCOUNTER — APPOINTMENT (OUTPATIENT)
Dept: CARDIOTHORACIC SURGERY | Facility: HOSPITAL | Age: 62
End: 2022-03-18

## 2022-03-18 ENCOUNTER — INPATIENT (INPATIENT)
Facility: HOSPITAL | Age: 62
LOS: 4 days | Discharge: HOME HEALTH | DRG: 236 | End: 2022-03-23
Attending: THORACIC SURGERY (CARDIOTHORACIC VASCULAR SURGERY) | Admitting: THORACIC SURGERY (CARDIOTHORACIC VASCULAR SURGERY)
Payer: COMMERCIAL

## 2022-03-18 VITALS
RESPIRATION RATE: 16 BRPM | HEART RATE: 78 BPM | DIASTOLIC BLOOD PRESSURE: 77 MMHG | TEMPERATURE: 98 F | SYSTOLIC BLOOD PRESSURE: 134 MMHG | OXYGEN SATURATION: 96 % | WEIGHT: 192.24 LBS

## 2022-03-18 DIAGNOSIS — I25.10 ATHEROSCLEROTIC HEART DISEASE OF NATIVE CORONARY ARTERY WITHOUT ANGINA PECTORIS: ICD-10-CM

## 2022-03-18 LAB
ALBUMIN SERPL ELPH-MCNC: 4.1 G/DL — SIGNIFICANT CHANGE UP (ref 3.3–5)
ALP SERPL-CCNC: 39 U/L — LOW (ref 40–120)
ALT FLD-CCNC: 12 U/L — SIGNIFICANT CHANGE UP (ref 10–45)
ANION GAP SERPL CALC-SCNC: 10 MMOL/L — SIGNIFICANT CHANGE UP (ref 5–17)
APTT BLD: 24.4 SEC — LOW (ref 27.5–35.5)
AST SERPL-CCNC: 24 U/L — SIGNIFICANT CHANGE UP (ref 10–40)
BASE EXCESS BLDV CALC-SCNC: -1 MMOL/L — SIGNIFICANT CHANGE UP (ref -2–2)
BASE EXCESS BLDV CALC-SCNC: 1.9 MMOL/L — SIGNIFICANT CHANGE UP (ref -2–2)
BASOPHILS # BLD AUTO: 0.06 K/UL — SIGNIFICANT CHANGE UP (ref 0–0.2)
BASOPHILS NFR BLD AUTO: 0.3 % — SIGNIFICANT CHANGE UP (ref 0–2)
BILIRUB SERPL-MCNC: 1.1 MG/DL — SIGNIFICANT CHANGE UP (ref 0.2–1.2)
BLOOD GAS VENOUS - CREATININE: SIGNIFICANT CHANGE UP MG/DL (ref 0.5–1.3)
BLOOD GAS VENOUS - CREATININE: SIGNIFICANT CHANGE UP MG/DL (ref 0.5–1.3)
BUN SERPL-MCNC: 21 MG/DL — SIGNIFICANT CHANGE UP (ref 7–23)
CA-I SERPL-SCNC: 1.02 MMOL/L — LOW (ref 1.15–1.33)
CA-I SERPL-SCNC: 1.05 MMOL/L — LOW (ref 1.15–1.33)
CALCIUM SERPL-MCNC: 8.3 MG/DL — LOW (ref 8.4–10.5)
CHLORIDE BLDV-SCNC: 102 MMOL/L — SIGNIFICANT CHANGE UP (ref 96–108)
CHLORIDE BLDV-SCNC: 103 MMOL/L — SIGNIFICANT CHANGE UP (ref 96–108)
CHLORIDE SERPL-SCNC: 107 MMOL/L — SIGNIFICANT CHANGE UP (ref 96–108)
CK MB BLD-MCNC: 11.8 % — HIGH (ref 0–3.5)
CK MB CFR SERPL CALC: 21.7 NG/ML — HIGH (ref 0–6.7)
CK SERPL-CCNC: 184 U/L — SIGNIFICANT CHANGE UP (ref 30–200)
CO2 BLDV-SCNC: 26 MMOL/L — SIGNIFICANT CHANGE UP (ref 22–26)
CO2 BLDV-SCNC: 29 MMOL/L — HIGH (ref 22–26)
CO2 SERPL-SCNC: 25 MMOL/L — SIGNIFICANT CHANGE UP (ref 22–31)
CREAT SERPL-MCNC: 0.68 MG/DL — SIGNIFICANT CHANGE UP (ref 0.5–1.3)
EGFR: 106 ML/MIN/1.73M2 — SIGNIFICANT CHANGE UP
EOSINOPHIL # BLD AUTO: 0.06 K/UL — SIGNIFICANT CHANGE UP (ref 0–0.5)
EOSINOPHIL NFR BLD AUTO: 0.3 % — SIGNIFICANT CHANGE UP (ref 0–6)
FIBRINOGEN PPP-MCNC: 380 MG/DL — SIGNIFICANT CHANGE UP (ref 330–520)
GAS PNL BLDA: SIGNIFICANT CHANGE UP
GAS PNL BLDV: 136 MMOL/L — SIGNIFICANT CHANGE UP (ref 136–145)
GAS PNL BLDV: 138 MMOL/L — SIGNIFICANT CHANGE UP (ref 136–145)
GAS PNL BLDV: SIGNIFICANT CHANGE UP
GLUCOSE BLDC GLUCOMTR-MCNC: 112 MG/DL — HIGH (ref 70–99)
GLUCOSE BLDC GLUCOMTR-MCNC: 117 MG/DL — HIGH (ref 70–99)
GLUCOSE BLDC GLUCOMTR-MCNC: 118 MG/DL — HIGH (ref 70–99)
GLUCOSE BLDC GLUCOMTR-MCNC: 129 MG/DL — HIGH (ref 70–99)
GLUCOSE BLDC GLUCOMTR-MCNC: 142 MG/DL — HIGH (ref 70–99)
GLUCOSE BLDC GLUCOMTR-MCNC: 157 MG/DL — HIGH (ref 70–99)
GLUCOSE BLDC GLUCOMTR-MCNC: 168 MG/DL — HIGH (ref 70–99)
GLUCOSE BLDC GLUCOMTR-MCNC: 173 MG/DL — HIGH (ref 70–99)
GLUCOSE BLDC GLUCOMTR-MCNC: 97 MG/DL — SIGNIFICANT CHANGE UP (ref 70–99)
GLUCOSE BLDV-MCNC: 146 MG/DL — HIGH (ref 70–99)
GLUCOSE BLDV-MCNC: 175 MG/DL — HIGH (ref 70–99)
GLUCOSE SERPL-MCNC: 152 MG/DL — HIGH (ref 70–99)
HCO3 BLDV-SCNC: 24 MMOL/L — SIGNIFICANT CHANGE UP (ref 22–29)
HCO3 BLDV-SCNC: 27 MMOL/L — SIGNIFICANT CHANGE UP (ref 22–29)
HCT VFR BLD CALC: 32.2 % — LOW (ref 39–50)
HCT VFR BLDA CALC: 29 % — LOW (ref 39–51)
HCT VFR BLDA CALC: 31 % — LOW (ref 39–51)
HGB BLD CALC-MCNC: 10.4 G/DL — LOW (ref 12.6–17.4)
HGB BLD CALC-MCNC: 9.8 G/DL — LOW (ref 12.6–17.4)
HGB BLD-MCNC: 11.2 G/DL — LOW (ref 13–17)
IMM GRANULOCYTES NFR BLD AUTO: 1.3 % — SIGNIFICANT CHANGE UP (ref 0–1.5)
INR BLD: 1.29 RATIO — HIGH (ref 0.88–1.16)
LACTATE BLDV-MCNC: 0.8 MMOL/L — SIGNIFICANT CHANGE UP (ref 0.7–2)
LACTATE BLDV-MCNC: 1.1 MMOL/L — SIGNIFICANT CHANGE UP (ref 0.7–2)
LYMPHOCYTES # BLD AUTO: 1.72 K/UL — SIGNIFICANT CHANGE UP (ref 1–3.3)
LYMPHOCYTES # BLD AUTO: 9.8 % — LOW (ref 13–44)
MCHC RBC-ENTMCNC: 30 PG — SIGNIFICANT CHANGE UP (ref 27–34)
MCHC RBC-ENTMCNC: 34.8 GM/DL — SIGNIFICANT CHANGE UP (ref 32–36)
MCV RBC AUTO: 86.3 FL — SIGNIFICANT CHANGE UP (ref 80–100)
MONOCYTES # BLD AUTO: 0.74 K/UL — SIGNIFICANT CHANGE UP (ref 0–0.9)
MONOCYTES NFR BLD AUTO: 4.2 % — SIGNIFICANT CHANGE UP (ref 2–14)
NEUTROPHILS # BLD AUTO: 14.76 K/UL — HIGH (ref 1.8–7.4)
NEUTROPHILS NFR BLD AUTO: 84.1 % — HIGH (ref 43–77)
NRBC # BLD: 0 /100 WBCS — SIGNIFICANT CHANGE UP (ref 0–0)
PCO2 BLDV: 42 MMHG — SIGNIFICANT CHANGE UP (ref 42–55)
PCO2 BLDV: 45 MMHG — SIGNIFICANT CHANGE UP (ref 42–55)
PH BLDV: 7.37 — SIGNIFICANT CHANGE UP (ref 7.32–7.43)
PH BLDV: 7.39 — SIGNIFICANT CHANGE UP (ref 7.32–7.43)
PLATELET # BLD AUTO: 190 K/UL — SIGNIFICANT CHANGE UP (ref 150–400)
PO2 BLDV: 60 MMHG — HIGH (ref 25–45)
PO2 BLDV: 81 MMHG — HIGH (ref 25–45)
POTASSIUM BLDV-SCNC: 4.8 MMOL/L — SIGNIFICANT CHANGE UP (ref 3.5–5.1)
POTASSIUM BLDV-SCNC: 5.2 MMOL/L — HIGH (ref 3.5–5.1)
POTASSIUM SERPL-MCNC: 3.9 MMOL/L — SIGNIFICANT CHANGE UP (ref 3.5–5.3)
POTASSIUM SERPL-SCNC: 3.9 MMOL/L — SIGNIFICANT CHANGE UP (ref 3.5–5.3)
PROT SERPL-MCNC: 6 G/DL — SIGNIFICANT CHANGE UP (ref 6–8.3)
PROTHROM AB SERPL-ACNC: 14.9 SEC — HIGH (ref 10.5–13.4)
RBC # BLD: 3.73 M/UL — LOW (ref 4.2–5.8)
RBC # FLD: 12.1 % — SIGNIFICANT CHANGE UP (ref 10.3–14.5)
SAO2 % BLDV: 90.5 % — HIGH (ref 67–88)
SAO2 % BLDV: 97.2 % — HIGH (ref 67–88)
SODIUM SERPL-SCNC: 142 MMOL/L — SIGNIFICANT CHANGE UP (ref 135–145)
TROPONIN T, HIGH SENSITIVITY RESULT: 242 NG/L — HIGH (ref 0–51)
WBC # BLD: 17.57 K/UL — HIGH (ref 3.8–10.5)
WBC # FLD AUTO: 17.57 K/UL — HIGH (ref 3.8–10.5)

## 2022-03-18 PROCEDURE — 33533 CABG ARTERIAL SINGLE: CPT

## 2022-03-18 PROCEDURE — 33517 CABG ARTERY-VEIN SINGLE: CPT

## 2022-03-18 PROCEDURE — 71045 X-RAY EXAM CHEST 1 VIEW: CPT | Mod: 26

## 2022-03-18 PROCEDURE — 86900 BLOOD TYPING SEROLOGIC ABO: CPT

## 2022-03-18 PROCEDURE — 86923 COMPATIBILITY TEST ELECTRIC: CPT

## 2022-03-18 PROCEDURE — P9047: CPT

## 2022-03-18 PROCEDURE — 93880 EXTRACRANIAL BILAT STUDY: CPT

## 2022-03-18 PROCEDURE — 87640 STAPH A DNA AMP PROBE: CPT

## 2022-03-18 PROCEDURE — 86901 BLOOD TYPING SEROLOGIC RH(D): CPT

## 2022-03-18 PROCEDURE — 80048 BASIC METABOLIC PNL TOTAL CA: CPT

## 2022-03-18 PROCEDURE — G0463: CPT

## 2022-03-18 PROCEDURE — 93010 ELECTROCARDIOGRAM REPORT: CPT

## 2022-03-18 PROCEDURE — 83036 HEMOGLOBIN GLYCOSYLATED A1C: CPT

## 2022-03-18 PROCEDURE — 33508 ENDOSCOPIC VEIN HARVEST: CPT | Mod: 59

## 2022-03-18 PROCEDURE — 99291 CRITICAL CARE FIRST HOUR: CPT | Mod: 25

## 2022-03-18 PROCEDURE — 87641 MR-STAPH DNA AMP PROBE: CPT

## 2022-03-18 PROCEDURE — 71046 X-RAY EXAM CHEST 2 VIEWS: CPT

## 2022-03-18 PROCEDURE — 86850 RBC ANTIBODY SCREEN: CPT

## 2022-03-18 DEVICE — KIT A-LINE 1LUM 20G X 12CM SAFE KIT: Type: IMPLANTABLE DEVICE | Status: FUNCTIONAL

## 2022-03-18 DEVICE — CHEST DRAIN THORACIC ARGYLE PVC 36FR STRAIGHT: Type: IMPLANTABLE DEVICE | Status: FUNCTIONAL

## 2022-03-18 DEVICE — IMPLANTABLE DEVICE: Type: IMPLANTABLE DEVICE | Status: FUNCTIONAL

## 2022-03-18 DEVICE — CANNULA IMA 1MM BLUNT TIP: Type: IMPLANTABLE DEVICE | Status: FUNCTIONAL

## 2022-03-18 DEVICE — LIGATING CLIPS WECK HORIZON MEDIUM (BLUE) 24: Type: IMPLANTABLE DEVICE | Status: FUNCTIONAL

## 2022-03-18 DEVICE — LIGATING CLIPS WECK HORIZON SMALL-WIDE (RED) 24: Type: IMPLANTABLE DEVICE | Status: FUNCTIONAL

## 2022-03-18 DEVICE — CANNULA VESSEL 3MM BLUNT TIP CLEAR 1-WAY VALVE: Type: IMPLANTABLE DEVICE | Status: FUNCTIONAL

## 2022-03-18 DEVICE — CANNULA ARTERIAL STRAIGHT 20FR X 3/8" VENTED: Type: IMPLANTABLE DEVICE | Status: FUNCTIONAL

## 2022-03-18 DEVICE — CANNULA VENOUS 2 STAGE THIN FLEX 36/46FR X 1/2" WITH RETURN DIAL: Type: IMPLANTABLE DEVICE | Status: FUNCTIONAL

## 2022-03-18 DEVICE — CHEST DRAIN THORACIC ARGYLE PVC 28FR RIGHT ANGLE: Type: IMPLANTABLE DEVICE | Status: FUNCTIONAL

## 2022-03-18 DEVICE — KIT CVC 2LUM MAC 9FR CHG: Type: IMPLANTABLE DEVICE | Status: FUNCTIONAL

## 2022-03-18 DEVICE — PACING WIRE BLUE DARK 2-0 24" SH SKS-3: Type: IMPLANTABLE DEVICE | Status: FUNCTIONAL

## 2022-03-18 DEVICE — CANNULA AORTIC ROOT 14G X 14CM FLANGED: Type: IMPLANTABLE DEVICE | Status: FUNCTIONAL

## 2022-03-18 RX ORDER — POTASSIUM CHLORIDE 20 MEQ
10 PACKET (EA) ORAL
Refills: 0 | Status: COMPLETED | OUTPATIENT
Start: 2022-03-18 | End: 2022-03-18

## 2022-03-18 RX ORDER — SENNA PLUS 8.6 MG/1
2 TABLET ORAL AT BEDTIME
Refills: 0 | Status: DISCONTINUED | OUTPATIENT
Start: 2022-03-19 | End: 2022-03-23

## 2022-03-18 RX ORDER — SODIUM CHLORIDE 9 MG/ML
1000 INJECTION INTRAMUSCULAR; INTRAVENOUS; SUBCUTANEOUS
Refills: 0 | Status: DISCONTINUED | OUTPATIENT
Start: 2022-03-18 | End: 2022-03-20

## 2022-03-18 RX ORDER — CEFUROXIME AXETIL 250 MG
1500 TABLET ORAL EVERY 8 HOURS
Refills: 0 | Status: COMPLETED | OUTPATIENT
Start: 2022-03-18 | End: 2022-03-20

## 2022-03-18 RX ORDER — POTASSIUM CHLORIDE 20 MEQ
10 PACKET (EA) ORAL
Refills: 0 | Status: DISCONTINUED | OUTPATIENT
Start: 2022-03-18 | End: 2022-03-20

## 2022-03-18 RX ORDER — ACETAMINOPHEN 500 MG
1000 TABLET ORAL ONCE
Refills: 0 | Status: COMPLETED | OUTPATIENT
Start: 2022-03-18 | End: 2022-03-18

## 2022-03-18 RX ORDER — HYDROMORPHONE HYDROCHLORIDE 2 MG/ML
1 INJECTION INTRAMUSCULAR; INTRAVENOUS; SUBCUTANEOUS ONCE
Refills: 0 | Status: DISCONTINUED | OUTPATIENT
Start: 2022-03-18 | End: 2022-03-18

## 2022-03-18 RX ORDER — MAGNESIUM SULFATE 500 MG/ML
2 VIAL (ML) INJECTION ONCE
Refills: 0 | Status: COMPLETED | OUTPATIENT
Start: 2022-03-18 | End: 2022-03-18

## 2022-03-18 RX ORDER — ACETAMINOPHEN 500 MG
650 TABLET ORAL EVERY 6 HOURS
Refills: 0 | Status: DISCONTINUED | OUTPATIENT
Start: 2022-03-21 | End: 2022-03-23

## 2022-03-18 RX ORDER — GABAPENTIN 400 MG/1
100 CAPSULE ORAL EVERY 8 HOURS
Refills: 0 | Status: COMPLETED | OUTPATIENT
Start: 2022-03-18 | End: 2022-03-23

## 2022-03-18 RX ORDER — AMIODARONE HYDROCHLORIDE 400 MG/1
400 TABLET ORAL
Refills: 0 | Status: COMPLETED | OUTPATIENT
Start: 2022-03-18 | End: 2022-03-21

## 2022-03-18 RX ORDER — ATORVASTATIN CALCIUM 80 MG/1
40 TABLET, FILM COATED ORAL AT BEDTIME
Refills: 0 | Status: DISCONTINUED | OUTPATIENT
Start: 2022-03-18 | End: 2022-03-19

## 2022-03-18 RX ORDER — DEXTROSE 50 % IN WATER 50 %
25 SYRINGE (ML) INTRAVENOUS
Refills: 0 | Status: DISCONTINUED | OUTPATIENT
Start: 2022-03-18 | End: 2022-03-19

## 2022-03-18 RX ORDER — CEFUROXIME AXETIL 250 MG
1500 TABLET ORAL EVERY 8 HOURS
Refills: 0 | Status: DISCONTINUED | OUTPATIENT
Start: 2022-03-18 | End: 2022-03-18

## 2022-03-18 RX ORDER — SODIUM CHLORIDE 9 MG/ML
3 INJECTION INTRAMUSCULAR; INTRAVENOUS; SUBCUTANEOUS EVERY 8 HOURS
Refills: 0 | Status: DISCONTINUED | OUTPATIENT
Start: 2022-03-18 | End: 2022-03-18

## 2022-03-18 RX ORDER — DEXMEDETOMIDINE HYDROCHLORIDE IN 0.9% SODIUM CHLORIDE 4 UG/ML
0.5 INJECTION INTRAVENOUS
Qty: 200 | Refills: 0 | Status: DISCONTINUED | OUTPATIENT
Start: 2022-03-18 | End: 2022-03-18

## 2022-03-18 RX ORDER — POLYETHYLENE GLYCOL 3350 17 G/17G
17 POWDER, FOR SOLUTION ORAL DAILY
Refills: 0 | Status: DISCONTINUED | OUTPATIENT
Start: 2022-03-19 | End: 2022-03-23

## 2022-03-18 RX ORDER — HYDROMORPHONE HYDROCHLORIDE 2 MG/ML
0.5 INJECTION INTRAMUSCULAR; INTRAVENOUS; SUBCUTANEOUS ONCE
Refills: 0 | Status: DISCONTINUED | OUTPATIENT
Start: 2022-03-18 | End: 2022-03-18

## 2022-03-18 RX ORDER — HYDROMORPHONE HYDROCHLORIDE 2 MG/ML
0.5 INJECTION INTRAMUSCULAR; INTRAVENOUS; SUBCUTANEOUS EVERY 6 HOURS
Refills: 0 | Status: DISCONTINUED | OUTPATIENT
Start: 2022-03-18 | End: 2022-03-20

## 2022-03-18 RX ORDER — NICARDIPINE HYDROCHLORIDE 30 MG/1
5 CAPSULE, EXTENDED RELEASE ORAL
Qty: 40 | Refills: 0 | Status: DISCONTINUED | OUTPATIENT
Start: 2022-03-18 | End: 2022-03-20

## 2022-03-18 RX ORDER — HYDROMORPHONE HYDROCHLORIDE 2 MG/ML
30 INJECTION INTRAMUSCULAR; INTRAVENOUS; SUBCUTANEOUS
Refills: 0 | Status: DISCONTINUED | OUTPATIENT
Start: 2022-03-18 | End: 2022-03-21

## 2022-03-18 RX ORDER — AMINOCAPROIC ACID 500 MG/1
5 TABLET ORAL ONCE
Refills: 0 | Status: COMPLETED | OUTPATIENT
Start: 2022-03-18 | End: 2022-03-18

## 2022-03-18 RX ORDER — SODIUM CHLORIDE 9 MG/ML
1000 INJECTION INTRAMUSCULAR; INTRAVENOUS; SUBCUTANEOUS
Refills: 0 | Status: DISCONTINUED | OUTPATIENT
Start: 2022-03-18 | End: 2022-03-21

## 2022-03-18 RX ORDER — CHLORHEXIDINE GLUCONATE 213 G/1000ML
1 SOLUTION TOPICAL DAILY
Refills: 0 | Status: DISCONTINUED | OUTPATIENT
Start: 2022-03-18 | End: 2022-03-21

## 2022-03-18 RX ORDER — INSULIN HUMAN 100 [IU]/ML
3 INJECTION, SOLUTION SUBCUTANEOUS
Qty: 100 | Refills: 0 | Status: DISCONTINUED | OUTPATIENT
Start: 2022-03-18 | End: 2022-03-19

## 2022-03-18 RX ORDER — ALBUMIN HUMAN 25 %
250 VIAL (ML) INTRAVENOUS
Refills: 0 | Status: COMPLETED | OUTPATIENT
Start: 2022-03-18 | End: 2022-03-19

## 2022-03-18 RX ORDER — FENTANYL CITRATE 50 UG/ML
50 INJECTION INTRAVENOUS ONCE
Refills: 0 | Status: DISCONTINUED | OUTPATIENT
Start: 2022-03-18 | End: 2022-03-18

## 2022-03-18 RX ORDER — CHLORHEXIDINE GLUCONATE 213 G/1000ML
1 SOLUTION TOPICAL ONCE
Refills: 0 | Status: DISCONTINUED | OUTPATIENT
Start: 2022-03-18 | End: 2022-03-18

## 2022-03-18 RX ORDER — ASPIRIN/CALCIUM CARB/MAGNESIUM 324 MG
81 TABLET ORAL DAILY
Refills: 0 | Status: DISCONTINUED | OUTPATIENT
Start: 2022-03-18 | End: 2022-03-23

## 2022-03-18 RX ORDER — ASPIRIN/CALCIUM CARB/MAGNESIUM 324 MG
81 TABLET ORAL ONCE
Refills: 0 | Status: COMPLETED | OUTPATIENT
Start: 2022-03-18 | End: 2022-03-18

## 2022-03-18 RX ORDER — FAMOTIDINE 10 MG/ML
20 INJECTION INTRAVENOUS EVERY 12 HOURS
Refills: 0 | Status: DISCONTINUED | OUTPATIENT
Start: 2022-03-18 | End: 2022-03-19

## 2022-03-18 RX ORDER — OXYCODONE HYDROCHLORIDE 5 MG/1
10 TABLET ORAL EVERY 4 HOURS
Refills: 0 | Status: DISCONTINUED | OUTPATIENT
Start: 2022-03-18 | End: 2022-03-20

## 2022-03-18 RX ORDER — DEXTROSE 50 % IN WATER 50 %
50 SYRINGE (ML) INTRAVENOUS
Refills: 0 | Status: DISCONTINUED | OUTPATIENT
Start: 2022-03-18 | End: 2022-03-19

## 2022-03-18 RX ORDER — OXYCODONE HYDROCHLORIDE 5 MG/1
5 TABLET ORAL EVERY 4 HOURS
Refills: 0 | Status: DISCONTINUED | OUTPATIENT
Start: 2022-03-18 | End: 2022-03-20

## 2022-03-18 RX ORDER — SODIUM CHLORIDE 9 MG/ML
500 INJECTION, SOLUTION INTRAVENOUS ONCE
Refills: 0 | Status: COMPLETED | OUTPATIENT
Start: 2022-03-18 | End: 2022-03-18

## 2022-03-18 RX ORDER — ACETAMINOPHEN 500 MG
650 TABLET ORAL EVERY 6 HOURS
Refills: 0 | Status: COMPLETED | OUTPATIENT
Start: 2022-03-18 | End: 2022-03-21

## 2022-03-18 RX ORDER — GABAPENTIN 400 MG/1
300 CAPSULE ORAL ONCE
Refills: 0 | Status: COMPLETED | OUTPATIENT
Start: 2022-03-18 | End: 2022-03-18

## 2022-03-18 RX ORDER — ALBUMIN HUMAN 25 %
250 VIAL (ML) INTRAVENOUS
Refills: 0 | Status: COMPLETED | OUTPATIENT
Start: 2022-03-18 | End: 2022-03-18

## 2022-03-18 RX ORDER — LIDOCAINE HCL 20 MG/ML
0.2 VIAL (ML) INJECTION ONCE
Refills: 0 | Status: DISCONTINUED | OUTPATIENT
Start: 2022-03-18 | End: 2022-03-18

## 2022-03-18 RX ORDER — ASCORBIC ACID 60 MG
500 TABLET,CHEWABLE ORAL
Refills: 0 | Status: COMPLETED | OUTPATIENT
Start: 2022-03-18 | End: 2022-03-23

## 2022-03-18 RX ORDER — ASPIRIN/CALCIUM CARB/MAGNESIUM 324 MG
300 TABLET ORAL ONCE
Refills: 0 | Status: DISCONTINUED | OUTPATIENT
Start: 2022-03-18 | End: 2022-03-18

## 2022-03-18 RX ORDER — CHLORHEXIDINE GLUCONATE 213 G/1000ML
15 SOLUTION TOPICAL EVERY 12 HOURS
Refills: 0 | Status: DISCONTINUED | OUTPATIENT
Start: 2022-03-18 | End: 2022-03-18

## 2022-03-18 RX ORDER — HYDROMORPHONE HYDROCHLORIDE 2 MG/ML
0.5 INJECTION INTRAMUSCULAR; INTRAVENOUS; SUBCUTANEOUS
Refills: 0 | Status: DISCONTINUED | OUTPATIENT
Start: 2022-03-18 | End: 2022-03-21

## 2022-03-18 RX ADMIN — HYDROMORPHONE HYDROCHLORIDE 1 MILLIGRAM(S): 2 INJECTION INTRAMUSCULAR; INTRAVENOUS; SUBCUTANEOUS at 13:33

## 2022-03-18 RX ADMIN — HYDROMORPHONE HYDROCHLORIDE 0.5 MILLIGRAM(S): 2 INJECTION INTRAMUSCULAR; INTRAVENOUS; SUBCUTANEOUS at 17:15

## 2022-03-18 RX ADMIN — Medication 100 MILLIGRAM(S): at 17:53

## 2022-03-18 RX ADMIN — Medication 125 MILLILITER(S): at 15:50

## 2022-03-18 RX ADMIN — FAMOTIDINE 20 MILLIGRAM(S): 10 INJECTION INTRAVENOUS at 18:54

## 2022-03-18 RX ADMIN — INSULIN HUMAN 3 UNIT(S)/HR: 100 INJECTION, SOLUTION SUBCUTANEOUS at 13:25

## 2022-03-18 RX ADMIN — FENTANYL CITRATE 50 MICROGRAM(S): 50 INJECTION INTRAVENOUS at 20:21

## 2022-03-18 RX ADMIN — HYDROMORPHONE HYDROCHLORIDE 0.5 MILLIGRAM(S): 2 INJECTION INTRAMUSCULAR; INTRAVENOUS; SUBCUTANEOUS at 17:04

## 2022-03-18 RX ADMIN — FENTANYL CITRATE 50 MICROGRAM(S): 50 INJECTION INTRAVENOUS at 20:05

## 2022-03-18 RX ADMIN — Medication 100 MILLIEQUIVALENT(S): at 13:46

## 2022-03-18 RX ADMIN — HYDROMORPHONE HYDROCHLORIDE 0.5 MILLIGRAM(S): 2 INJECTION INTRAMUSCULAR; INTRAVENOUS; SUBCUTANEOUS at 23:07

## 2022-03-18 RX ADMIN — Medication 400 MILLIGRAM(S): at 18:54

## 2022-03-18 RX ADMIN — NICARDIPINE HYDROCHLORIDE 25 MG/HR: 30 CAPSULE, EXTENDED RELEASE ORAL at 15:49

## 2022-03-18 RX ADMIN — HYDROMORPHONE HYDROCHLORIDE 0.5 MILLIGRAM(S): 2 INJECTION INTRAMUSCULAR; INTRAVENOUS; SUBCUTANEOUS at 18:15

## 2022-03-18 RX ADMIN — Medication 100 MILLIEQUIVALENT(S): at 13:19

## 2022-03-18 RX ADMIN — GABAPENTIN 100 MILLIGRAM(S): 400 CAPSULE ORAL at 21:30

## 2022-03-18 RX ADMIN — Medication 100 MILLIEQUIVALENT(S): at 18:19

## 2022-03-18 RX ADMIN — SODIUM CHLORIDE 3 MILLILITER(S): 9 INJECTION INTRAMUSCULAR; INTRAVENOUS; SUBCUTANEOUS at 06:21

## 2022-03-18 RX ADMIN — HYDROMORPHONE HYDROCHLORIDE 0.5 MILLIGRAM(S): 2 INJECTION INTRAMUSCULAR; INTRAVENOUS; SUBCUTANEOUS at 18:28

## 2022-03-18 RX ADMIN — Medication 125 MILLILITER(S): at 19:22

## 2022-03-18 RX ADMIN — Medication 1000 MILLIGRAM(S): at 06:08

## 2022-03-18 RX ADMIN — Medication 100 MILLIEQUIVALENT(S): at 20:15

## 2022-03-18 RX ADMIN — Medication 125 MILLILITER(S): at 13:00

## 2022-03-18 RX ADMIN — Medication 25 GRAM(S): at 15:49

## 2022-03-18 RX ADMIN — HYDROMORPHONE HYDROCHLORIDE 0.5 MILLIGRAM(S): 2 INJECTION INTRAMUSCULAR; INTRAVENOUS; SUBCUTANEOUS at 15:20

## 2022-03-18 RX ADMIN — Medication 100 MILLIEQUIVALENT(S): at 12:58

## 2022-03-18 RX ADMIN — HYDROMORPHONE HYDROCHLORIDE 1 MILLIGRAM(S): 2 INJECTION INTRAMUSCULAR; INTRAVENOUS; SUBCUTANEOUS at 13:30

## 2022-03-18 RX ADMIN — Medication 81 MILLIGRAM(S): at 21:26

## 2022-03-18 RX ADMIN — DEXMEDETOMIDINE HYDROCHLORIDE IN 0.9% SODIUM CHLORIDE 10.9 MICROGRAM(S)/KG/HR: 4 INJECTION INTRAVENOUS at 13:25

## 2022-03-18 RX ADMIN — HYDROMORPHONE HYDROCHLORIDE 0.5 MILLIGRAM(S): 2 INJECTION INTRAMUSCULAR; INTRAVENOUS; SUBCUTANEOUS at 22:15

## 2022-03-18 RX ADMIN — HYDROMORPHONE HYDROCHLORIDE 0.5 MILLIGRAM(S): 2 INJECTION INTRAMUSCULAR; INTRAVENOUS; SUBCUTANEOUS at 16:17

## 2022-03-18 RX ADMIN — HYDROMORPHONE HYDROCHLORIDE 30 MILLILITER(S): 2 INJECTION INTRAMUSCULAR; INTRAVENOUS; SUBCUTANEOUS at 22:21

## 2022-03-18 RX ADMIN — Medication 100 MILLIEQUIVALENT(S): at 15:00

## 2022-03-18 RX ADMIN — SODIUM CHLORIDE 500 MILLILITER(S): 9 INJECTION, SOLUTION INTRAVENOUS at 16:49

## 2022-03-18 RX ADMIN — Medication 100 MILLIEQUIVALENT(S): at 15:30

## 2022-03-18 RX ADMIN — Medication 1000 MILLIGRAM(S): at 19:37

## 2022-03-18 RX ADMIN — Medication 125 MILLILITER(S): at 13:34

## 2022-03-18 RX ADMIN — GABAPENTIN 300 MILLIGRAM(S): 400 CAPSULE ORAL at 06:10

## 2022-03-18 RX ADMIN — AMINOCAPROIC ACID 250 GRAM(S): 500 TABLET ORAL at 13:27

## 2022-03-18 NOTE — BRIEF OPERATIVE NOTE - NSICDXBRIEFPROCEDURE_GEN_ALL_CORE_FT
PROCEDURES:  Bypass graft, coronary artery, 1 arterial and 1 venous 18-Mar-2022 12:55:21  Real Platt

## 2022-03-18 NOTE — PATIENT PROFILE ADULT - ARRIVAL FROM
Mask was worn during the entire patient examination. Nikunj Kramer is a 49 y.o. male who presents to the ED with a chief complaint of dark stool for the last 3 to 4 days. He has had about 4 episodes of it today. He reports history of liver cancer and I reviewed his endoscopy report which showed varices as well as gastritis. He is not on any blood thinners. States he is never had major bleeding before. Has had some vomiting that was slightly dry was dry heaving in the room when I was talking to him. Patient has not been able to keep any food or drink down for the last 4 days. No past medical history on file. Past Surgical History:  
Procedure Laterality Date  IR PARACENTESIS ABD W IMAGE  10/29/2020  IR PARACENTESIS ABD W IMAGE  11/25/2020  IR PARACENTESIS ABD W IMAGE  12/18/2020 No family history on file. Social History Socioeconomic History  Marital status:  Spouse name: Not on file  Number of children: Not on file  Years of education: Not on file  Highest education level: Not on file Occupational History  Not on file Social Needs  Financial resource strain: Not on file  Food insecurity Worry: Not on file Inability: Not on file  Transportation needs Medical: Not on file Non-medical: Not on file Tobacco Use  Smoking status: Former Smoker  Smokeless tobacco: Former User Quit date: 12/7/2009 Substance and Sexual Activity  Alcohol use: Not on file  Drug use: Not on file  Sexual activity: Not on file Lifestyle  Physical activity Days per week: Not on file Minutes per session: Not on file  Stress: Not on file Relationships  Social connections Talks on phone: Not on file Gets together: Not on file Attends [employer] service: Not on file Active member of club or organization: Not on file Attends meetings of clubs or organizations: Not on file Relationship status: Not on file  Intimate partner violence Fear of current or ex partner: Not on file Emotionally abused: Not on file Physically abused: Not on file Forced sexual activity: Not on file Other Topics Concern  Not on file Social History Narrative  Not on file ALLERGIES: Patient has no known allergies. Review of Systems Constitutional: Positive for fatigue. Negative for chills and fever. HENT: Negative for congestion, sore throat, tinnitus, trouble swallowing and voice change. Respiratory: Negative for chest tightness and shortness of breath. Cardiovascular: Negative for chest pain and palpitations. Gastrointestinal: Positive for abdominal distention, abdominal pain, anal bleeding, blood in stool, diarrhea, nausea and vomiting. Skin: Negative for pallor and rash. All other systems reviewed and are negative. Vitals:  
 12/27/20 1423 BP: 124/84 Pulse: (!) 105 Resp: 24 Temp: 97.8 °F (36.6 °C) SpO2: 95% Weight: 81.6 kg (180 lb) Height: 5' 9\" (1.753 m) Physical Exam 
Vitals signs and nursing note reviewed. Constitutional:   
   Appearance: He is well-developed. He is ill-appearing and toxic-appearing. He is not diaphoretic. Comments: Actively dry heaving. HENT:  
   Head: Normocephalic and atraumatic. Eyes:  
   General: Scleral icterus present. Neck:  
   Trachea: No tracheal deviation. Pulmonary:  
   Effort: Pulmonary effort is normal. No respiratory distress. Breath sounds: No stridor. No wheezing, rhonchi or rales. Chest:  
   Chest wall: No tenderness. Abdominal:  
   General: There is distension. Palpations: Abdomen is soft. Tenderness: There is no abdominal tenderness. There is no guarding or rebound. Hernia: No hernia is present. Genitourinary: 
   Comments: Dark stool that is hemoccult positive.      
Skin: 
 Capillary Refill: Capillary refill takes less than 2 seconds. Neurological:  
   General: No focal deficit present. Mental Status: He is alert and oriented to person, place, and time. Mental status is at baseline. Psychiatric:     
   Mood and Affect: Mood normal.     
   Behavior: Behavior normal.  
 
  
 
MDM Number of Diagnoses or Management Options Diagnosis management comments: Has hepatic failure with new onset renal failure with hyperkalemia also active GI bleeding and I have given him Protonix as well as octreotide. Hyperkalemia is being treated as well.  benjamin with increased bun to creatine ratio. I discussed case with Dr. Moni Choi of GI who is coming in to see him and planning on likely do an endoscopy at [facility].  I also discussed case with the hospitalist for admission. Patient was for crossed for 2 units of blood none given at this time. Greg Gonzales MD; 12/27/2020 @7:19 PM Voice dictation software was used during the making of this note. This software is not perfect and grammatical and other typographical errors may be present. This note has not been proofread for errors. 
=================================================================== Amount and/or Complexity of Data Reviewed Clinical lab tests: ordered and reviewed (Results for orders placed or performed during the hospital encounter of 12/27/20 
-CBC WITH AUTOMATED DIFF Result                      Value             Ref Range WBC                         7.3               4.3 - 11.1 K* 
     RBC                         2.64 (L)          4.23 - 5.6 M* HGB                         9.3 (L)           13.6 - 17.2 * HCT                         27.0 (L)          41.1 - 50.3 % MCV                         102.3 (H)         79.6 - 97.8 * MCH                         35.2 (H)          26.1 - 32.9 * MCHC                        34.4              31.4 - 35.0 * RDW                         17.4 (H)          11.9 - 14.6 % PLATELET                    121 (L)           150 - 450 K/* MPV                         11.0              9.4 - 12.3 FL ABSOLUTE NRBC               0.00              0.0 - 0.2 K/* DF                          AUTOMATED NEUTROPHILS                 75                43 - 78 % LYMPHOCYTES                 17                13 - 44 % MONOCYTES                   7                 4.0 - 12.0 % EOSINOPHILS                 0 (L)             0.5 - 7.8 % BASOPHILS                   0                 0.0 - 2.0 % IMMATURE GRANULOCYTES       1                 0.0 - 5.0 %   
     ABS. NEUTROPHILS            5.5               1.7 - 8.2 K/* ABS. LYMPHOCYTES            1.2               0.5 - 4.6 K/* ABS. MONOCYTES              0.5               0.1 - 1.3 K/* ABS. EOSINOPHILS            0.0               0.0 - 0.8 K/* ABS. BASOPHILS              0.0               0.0 - 0.2 K/* ABS. IMM. GRANS.            0.1               0.0 - 0.5 K/* 
-METABOLIC PANEL, COMPREHENSIVE Result                      Value             Ref Range Sodium                      134 (L)           136 - 145 mm* Potassium                   6.5 (HH)          3.5 - 5.1 mm* Chloride                    106               98 - 107 mmo* CO2                         14 (L)            21 - 32 mmol* Anion gap                   14                7 - 16 mmol/L Glucose                     136 (H)           65 - 100 mg/* BUN                         86 (H)            6 - 23 MG/DL Creatinine                  2.18 (H)          0.8 - 1.5 MG* 
     GFR est AA                  41 (L)            >60 ml/min/1* GFR est non-AA              34 (L)            >60 ml/min/1*      Calcium                     8.3               8.3 - 10.4 M* 
 Bilirubin, total            4.4 (H)           0.2 - 1.1 MG* ALT (SGPT)                  93 (H)            12 - 65 U/L   
     AST (SGOT)                  195 (H)           15 - 37 U/L Alk. phosphatase            96                50 - 136 U/L Protein, total              7.5               6.3 - 8.2 g/* Albumin                     1.8 (L)           3.5 - 5.0 g/* Globulin                    5.7 (H)           2.3 - 3.5 g/* A-G Ratio                   0.3 (L)           1.2 - 3.5     ) Tests in the radiology section of CPT®: ordered and reviewed Discuss the patient with other providers: yes Critical Care Performed by: Pierce Donald MD 
Authorized by: Pierce Donald MD  
 
Critical care provider statement:  
  Critical care time (minutes):  35 
  Critical care time was exclusive of:  Separately billable procedures and treating other patients Critical care was necessary to treat or prevent imminent or life-threatening deterioration of the following conditions:  Hepatic failure and renal failure Critical care was time spent personally by me on the following activities:  Ordering and performing treatments and interventions, ordering and review of laboratory studies, discussions with consultants, ordering and review of radiographic studies, discussions with primary provider, pulse oximetry, evaluation of patient's response to treatment and examination of patient Home

## 2022-03-18 NOTE — BRIEF OPERATIVE NOTE - OPERATION/FINDINGS
h/o CAD w/left main disease and is now s/p CABG x2 [LIMA-LAd, RSVG-OM]      ***normal biventricular function pre- and postoperatively  ***To ICU on precedex

## 2022-03-18 NOTE — PATIENT PROFILE ADULT - NSPROPTRIGHTBILLOFRIGHTS_GEN_A_NUR
Recommendations from today's visit                                                       1. The incision on the abscess will remain open for the next few days and may continue to drain spontaneously.  I recommend wearing a pad.  You may use ibuprofen or acetaminophen for discomfort.     2. You were prescribed Bactrim to take twice daily for ten days.     3.  I recommend following up with your asthma/allergist to discuss your asthma control    Next appointment: as needed for recurrent abscess or discomfort    To reschedule your appointment, please call the clinic directly at [phone].   It was a pleasure seeing you today! I look forward to seeing you again.           patient

## 2022-03-18 NOTE — PROGRESS NOTE ADULT - SUBJECTIVE AND OBJECTIVE BOX
CHIEF COMPLAINT:    HPI:  This is a 60 y/o  male with PMH of HTN, HLD, c/o chest tightness on exertion, s/p cath 3/9/22 revealed + CAD , he presents today for  coronary artery bypass grafting x 3 on  3/18/22  covid swab to be done today at [facility]  denies recent travel, sick contact or covid infections  (15 Mar 2022 14:02)    PAST MEDICAL & SURGICAL HISTORY:  HTN (hypertension)    Hyperlipidemia    No significant past surgical history        FAMILY HISTORY:  FH: heart attack (Grandparent)  at 79yo    Family history of CABG (Father)  71yo      Social History:    Marital Status:  X    (   ) Single    (   )    (  )   Occupation: Retired   Lives with: (  ) alone  (  ) children  X spouse   (  ) parents  (  ) other    Substance Use (street drugs): X never used  (  ) other:  Tobacco Usage: X never smoked   (   ) former smoker   (   ) current smoker  (     ) pack year  (    ) last cigarette date  Alcohol Usage: Social      OBJECTIVE:  Vital Signs Last 24 Hrs  T(C): 36.9 (18 Mar 2022 20:00), Max: 36.9 (18 Mar 2022 20:00)  T(F): 98.4 (18 Mar 2022 20:00), Max: 98.4 (18 Mar 2022 20:00)  HR: 88 (18 Mar 2022 20:00) (72 - 88)  BP: 134/77 (18 Mar 2022 09:10) (134/77 - 134/77)  BP(mean): --  RR: 18 (18 Mar 2022 20:00) (12 - 25)  SpO2: 100% (18 Mar 2022 20:00) (96% - 100%)  Mode: CPAP with PS, FiO2: 50, PEEP: 5, PS: 5, MAP: 8, PIP: 17    03-18 @ 07:01  -  03-18 @ 20:34  --------------------------------------------------------  IN: 2181.9 mL / OUT: 1260 mL / NET: 921.9 mL      HOSPITAL MEDICATIONS:   acetaminophen     Tablet .. 650 milliGRAM(s) Oral every 6 hours  albumin human  5% IVPB 250 milliLiter(s) IV Intermittent every 30 minutes  aMIOdarone    Tablet 400 milliGRAM(s) Oral two times a day  ascorbic acid 500 milliGRAM(s) Oral two times a day  aspirin  chewable 81 milliGRAM(s) Oral once  aspirin enteric coated 81 milliGRAM(s) Oral daily  atorvastatin 40 milliGRAM(s) Oral at bedtime  cefuroxime  IVPB 1500 milliGRAM(s) IV Intermittent every 8 hours  chlorhexidine 2% Cloths 1 Application(s) Topical daily  dextrose 50% Injectable 50 milliLiter(s) IV Push every 15 minutes  dextrose 50% Injectable 25 milliLiter(s) IV Push every 15 minutes  famotidine Injectable 20 milliGRAM(s) IV Push every 12 hours  gabapentin 100 milliGRAM(s) Oral every 8 hours  HYDROmorphone  Injectable 0.5 milliGRAM(s) IV Push every 6 hours PRN  insulin regular Infusion 3 Unit(s)/Hr IV Continuous <Continuous>  niCARdipine Infusion 5 mG/Hr IV Continuous <Continuous>  oxyCODONE    IR 5 milliGRAM(s) Oral every 4 hours PRN  oxyCODONE    IR 10 milliGRAM(s) Oral every 4 hours PRN  potassium chloride  10 mEq/50 mL IVPB 10 milliEquivalent(s) IV Intermittent every 1 hour  potassium chloride  10 mEq/50 mL IVPB 10 milliEquivalent(s) IV Intermittent every 1 hour  potassium chloride  10 mEq/50 mL IVPB 10 milliEquivalent(s) IV Intermittent every 1 hour  potassium chloride  10 mEq/50 mL IVPB 10 milliEquivalent(s) IV Intermittent every 1 hour  sodium chloride 0.9%. 1000 milliLiter(s) IV Continuous <Continuous>    No Known Allergies    PHYSICAL EXAM:    General: WN/WD NAD  Neurology: A&Ox3, nonfocal, MAYES x 4  Eyes: PERRLA/ EOMI, Gross vision intact  ENT/Neck: Neck supple, trachea midline, No JVD, Gross hearing intact  Respiratory: CTA B/L, No wheezing, rales, rhonchi  CV: RRR, S1S2, no murmurs, rubs or gallops  Abdominal: Soft, NT, ND +BS,   Extremities: No edema, + peripheral pulses  Skin: No Rashes, Hematoma, Ecchymosis      LABS:                        11.2   17.57 )-----------( 190      ( 18 Mar 2022 12:58 )             32.2     03-18    142  |  107  |  21  ----------------------------<  152<H>  3.9   |  25  |  0.68    Ca    8.3<L>      18 Mar 2022 12:57    TPro  6.0  /  Alb  4.1  /  TBili  1.1  /  DBili  x   /  AST  24  /  ALT  12  /  AlkPhos  39<L>  03-18    PT/INR - ( 18 Mar 2022 12:58 )   PT: 14.9 sec;   INR: 1.29 ratio         PTT - ( 18 Mar 2022 12:58 )  PTT:24.4 sec    LIVER FUNCTIONS - ( 18 Mar 2022 12:57 )  Alb: 4.1 g/dL / Pro: 6.0 g/dL / ALK PHOS: 39 U/L / ALT: 12 U/L / AST: 24 U/L / GGT: x           Arterial Blood Gas:  03-18 @ 19:55  7.35/42/129/23/98.1/-2.3  ABG lactate: --  Arterial Blood Gas:  03-18 @ 18:18  7.31/45/117/23/98.6/-3.6  ABG lactate: --  Arterial Blood Gas:  03-18 @ 17:30  7.34/43/203/23/99.3/-2.5  ABG lactate: --  Arterial Blood Gas:  03-18 @ 13:56  7.42/38/160/25/98.7/0.2  ABG lactate: --  Arterial Blood Gas:  03-18 @ 12:45  7.38/44/382/26/99.3/0.6  ABG lactate: --  Arterial Blood Gas:  03-18 @ 11:14  7.40/42/314/26/99.1/1.0  ABG lactate: --  Arterial Blood Gas:  03-18 @ 10:48  7.44/42/397/28/98.8/3.9  ABG lactate: --  Arterial Blood Gas:  03-18 @ 10:17  7.40/39/423/24/99.1/-0.5  ABG lactate: --  Arterial Blood Gas:  03-18 @ 09:38  7.44/38/219/26/98.7/1.7  ABG lactate: --  Arterial Blood Gas:  03-18 @ 08:20  7.45/37/453/26/99.0/1.8  ABG lactate: --   Venous Blood Gas:  03-18 @ 10:49  7.39/45/60/27/90.5  VBG Lactate: 1.1  Venous Blood Gas:  03-18 @ 10:17  7.37/42/81/24/97.2  VBG Lactate: 0.8    CARDIAC MARKERS ( 18 Mar 2022 12:57 )  x     / x     / 184 U/L / x     / 21.7 ng/mL      LIVER FUNCTIONS - ( 18 Mar 2022 12:57 )  Alb: 4.1 g/dL / Pro: 6.0 g/dL / ALK PHOS: 39 U/L / ALT: 12 U/L / AST: 24 U/L / GGT: x               RADIOLOGY:  X Reviewed and interpreted by me    ASSESSMENT and PLAN:    NEURO: Non focal exam, continue post operative neurologic monitoring. Analgesic needs assessed and intermittent pain medication ordered. Neurontin and acetaminophen ordered for narcotic sparing pain control.    RESPIRATORY: Patient returned to CTU on full ventilator support. After awakening from anesthesia, patient weaned to pressure support and extubated. Continue close monitoring of respiratory rate, breathing pattern, and intermittent blood gas analysis in order to manage and prevent respiratory decompensation.    CVS: Patient has required volume resuscitation due to high urine output post operatively. Invasive hemodynamic monitoring with a central venous line and an arterial line required for continuous central venous and arterial pressures in order to detect and respond to cardiovascular decompensation. ASA ordered for prevention of graft thrombosis. Empiric Amiodarone for prevention of atrial fibrillation.    GI: Pepcid ordered for stress ulcer prophylaxis.    RENAL: Optimize renal perfusion with adequate volume resuscitation and close monitoring of urine output, electrolytes and BUN/creatinine.    HEME: Anemia due to acute blood loss, no current plan for transfusion.    ENDOCRINE: Patient requires a continuous insulin infusion and hourly glucose checks for management of stress hyperglycemia and prevention of infection and renal dysfunction associated with hyperglycemia.    ID: Perioperative Zinacef ordered.     Patient requires continuous monitoring with EKG, arterial line, central venous line, pulse oximetry, close monitoring of breathing pattern and intermittent blood gas analysis in order to prevent or respond to cardiopulmonary instability or decompensation.    Care plan discussed with ICU care team. I have spent 30 minutes providing non routine post op care for this critically ill patient.   CHIEF COMPLAINT:    HPI:  This is a 60 y/o  male with PMH of HTN, HLD, c/o chest tightness on exertion, s/p cath 3/9/22 revealed + CAD , he presents today for  coronary artery bypass grafting x 3 on  3/18/22  covid swab to be done today at [facility]  denies recent travel, sick contact or covid infections  (15 Mar 2022 14:02)    PAST MEDICAL & SURGICAL HISTORY:  HTN (hypertension)    Hyperlipidemia    No significant past surgical history        FAMILY HISTORY:  FH: heart attack (Grandparent)  at 77yo    Family history of CABG (Father)  71yo      Social History:    Marital Status:  X    (   ) Single    (   )    (  )   Occupation: Retired   Lives with: (  ) alone  (  ) children  X spouse   (  ) parents  (  ) other    Substance Use (street drugs): X never used  (  ) other:  Tobacco Usage: X never smoked   (   ) former smoker   (   ) current smoker  (     ) pack year  (    ) last cigarette date  Alcohol Usage: Social      OBJECTIVE:  Vital Signs Last 24 Hrs  T(C): 36.9 (18 Mar 2022 20:00), Max: 36.9 (18 Mar 2022 20:00)  T(F): 98.4 (18 Mar 2022 20:00), Max: 98.4 (18 Mar 2022 20:00)  HR: 88 (18 Mar 2022 20:00) (72 - 88)  BP: 134/77 (18 Mar 2022 09:10) (134/77 - 134/77)  BP(mean): --  RR: 18 (18 Mar 2022 20:00) (12 - 25)  SpO2: 100% (18 Mar 2022 20:00) (96% - 100%)  Mode: CPAP with PS, FiO2: 50, PEEP: 5, PS: 5, MAP: 8, PIP: 17    03-18 @ 07:01  -  03-18 @ 20:34  --------------------------------------------------------  IN: 2181.9 mL / OUT: 1260 mL / NET: 921.9 mL      HOSPITAL MEDICATIONS:   acetaminophen     Tablet .. 650 milliGRAM(s) Oral every 6 hours  albumin human  5% IVPB 250 milliLiter(s) IV Intermittent every 30 minutes  aMIOdarone    Tablet 400 milliGRAM(s) Oral two times a day  ascorbic acid 500 milliGRAM(s) Oral two times a day  aspirin  chewable 81 milliGRAM(s) Oral once  aspirin enteric coated 81 milliGRAM(s) Oral daily  atorvastatin 40 milliGRAM(s) Oral at bedtime  cefuroxime  IVPB 1500 milliGRAM(s) IV Intermittent every 8 hours  chlorhexidine 2% Cloths 1 Application(s) Topical daily  dextrose 50% Injectable 50 milliLiter(s) IV Push every 15 minutes  dextrose 50% Injectable 25 milliLiter(s) IV Push every 15 minutes  famotidine Injectable 20 milliGRAM(s) IV Push every 12 hours  gabapentin 100 milliGRAM(s) Oral every 8 hours  HYDROmorphone  Injectable 0.5 milliGRAM(s) IV Push every 6 hours PRN  insulin regular Infusion 3 Unit(s)/Hr IV Continuous <Continuous>  niCARdipine Infusion 5 mG/Hr IV Continuous <Continuous>  oxyCODONE    IR 5 milliGRAM(s) Oral every 4 hours PRN  oxyCODONE    IR 10 milliGRAM(s) Oral every 4 hours PRN  potassium chloride  10 mEq/50 mL IVPB 10 milliEquivalent(s) IV Intermittent every 1 hour  potassium chloride  10 mEq/50 mL IVPB 10 milliEquivalent(s) IV Intermittent every 1 hour  potassium chloride  10 mEq/50 mL IVPB 10 milliEquivalent(s) IV Intermittent every 1 hour  potassium chloride  10 mEq/50 mL IVPB 10 milliEquivalent(s) IV Intermittent every 1 hour  sodium chloride 0.9%. 1000 milliLiter(s) IV Continuous <Continuous>    No Known Allergies    PHYSICAL EXAM:    General: WN/WD NAD  Neurology: A&Ox3, nonfocal, MAYES x 4  Eyes: PERRLA/ EOMI, Gross vision intact  ENT/Neck: Neck supple, trachea midline, No JVD, Gross hearing intact  Respiratory: CTA B/L, No wheezing, rales, rhonchi  CV: RRR, S1S2, no murmurs, rubs or gallops  Abdominal: Soft, NT, ND +BS,   Extremities: No edema, + peripheral pulses  Skin: No Rashes, Hematoma, Ecchymosis      LABS:                        11.2   17.57 )-----------( 190      ( 18 Mar 2022 12:58 )             32.2     03-18    142  |  107  |  21  ----------------------------<  152<H>  3.9   |  25  |  0.68    Ca    8.3<L>      18 Mar 2022 12:57    TPro  6.0  /  Alb  4.1  /  TBili  1.1  /  DBili  x   /  AST  24  /  ALT  12  /  AlkPhos  39<L>  03-18    PT/INR - ( 18 Mar 2022 12:58 )   PT: 14.9 sec;   INR: 1.29 ratio         PTT - ( 18 Mar 2022 12:58 )  PTT:24.4 sec    LIVER FUNCTIONS - ( 18 Mar 2022 12:57 )  Alb: 4.1 g/dL / Pro: 6.0 g/dL / ALK PHOS: 39 U/L / ALT: 12 U/L / AST: 24 U/L / GGT: x           Arterial Blood Gas:  03-18 @ 19:55  7.35/42/129/23/98.1/-2.3  ABG lactate: --  Arterial Blood Gas:  03-18 @ 18:18  7.31/45/117/23/98.6/-3.6  ABG lactate: --  Arterial Blood Gas:  03-18 @ 17:30  7.34/43/203/23/99.3/-2.5  ABG lactate: --  Arterial Blood Gas:  03-18 @ 13:56  7.42/38/160/25/98.7/0.2  ABG lactate: --  Arterial Blood Gas:  03-18 @ 12:45  7.38/44/382/26/99.3/0.6  ABG lactate: --  Arterial Blood Gas:  03-18 @ 11:14  7.40/42/314/26/99.1/1.0  ABG lactate: --  Arterial Blood Gas:  03-18 @ 10:48  7.44/42/397/28/98.8/3.9  ABG lactate: --  Arterial Blood Gas:  03-18 @ 10:17  7.40/39/423/24/99.1/-0.5  ABG lactate: --  Arterial Blood Gas:  03-18 @ 09:38  7.44/38/219/26/98.7/1.7  ABG lactate: --  Arterial Blood Gas:  03-18 @ 08:20  7.45/37/453/26/99.0/1.8  ABG lactate: --   Venous Blood Gas:  03-18 @ 10:49  7.39/45/60/27/90.5  VBG Lactate: 1.1  Venous Blood Gas:  03-18 @ 10:17  7.37/42/81/24/97.2  VBG Lactate: 0.8    CARDIAC MARKERS ( 18 Mar 2022 12:57 )  x     / x     / 184 U/L / x     / 21.7 ng/mL      LIVER FUNCTIONS - ( 18 Mar 2022 12:57 )  Alb: 4.1 g/dL / Pro: 6.0 g/dL / ALK PHOS: 39 U/L / ALT: 12 U/L / AST: 24 U/L / GGT: x               RADIOLOGY:  X Reviewed and interpreted by me    ASSESSMENT and PLAN:    NEURO: Non focal exam, continue post operative neurologic monitoring. Analgesic needs assessed and intermittent pain medication ordered. Neurontin and acetaminophen ordered for narcotic sparing pain control.    RESPIRATORY: Patient returned to CTU on full ventilator support. After awakening from anesthesia, patient weaned to pressure support and extubated. Mild respiratory acidosis post extubation, encourage incentive spirometry and optimize pain control to prevent splinting. Continue close monitoring of respiratory rate, breathing pattern, and intermittent blood gas analysis in order to manage and prevent respiratory decompensation.    CVS: Patient has required volume resuscitation due to high urine output post operatively. Invasive hemodynamic monitoring with a central venous line and an arterial line required for continuous central venous and arterial pressures in order to detect and respond to cardiovascular decompensation. ASA ordered for prevention of graft thrombosis. Empiric Amiodarone for prevention of atrial fibrillation.    GI: Pepcid ordered for stress ulcer prophylaxis.    RENAL: Optimize renal perfusion with adequate volume resuscitation and close monitoring of urine output, electrolytes and BUN/creatinine.    HEME: Anemia due to acute blood loss, no current plan for transfusion.    ENDOCRINE: Patient requires a continuous insulin infusion and hourly glucose checks for management of stress hyperglycemia and prevention of infection and renal dysfunction associated with hyperglycemia.    ID: Perioperative Zinacef ordered.     Patient requires continuous monitoring with EKG, arterial line, central venous line, pulse oximetry, close monitoring of breathing pattern and intermittent blood gas analysis in order to prevent or respond to cardiopulmonary instability or decompensation.    Care plan discussed with ICU care team. I have spent 30 minutes providing non routine post op care for this critically ill patient.

## 2022-03-18 NOTE — AIRWAY REMOVAL NOTE  ADULT & PEDS - ARTIFICAL AIRWAY REMOVAL COMMENTS
Written order for extubation verified. Pt was identified by full name and birthday compared to ID band.  Present during the procedure was Georgina FITZPATRICK

## 2022-03-19 LAB
ALBUMIN SERPL ELPH-MCNC: 4.8 G/DL — SIGNIFICANT CHANGE UP (ref 3.3–5)
ALP SERPL-CCNC: 33 U/L — LOW (ref 40–120)
ALT FLD-CCNC: 13 U/L — SIGNIFICANT CHANGE UP (ref 10–45)
ANION GAP SERPL CALC-SCNC: 13 MMOL/L — SIGNIFICANT CHANGE UP (ref 5–17)
AST SERPL-CCNC: 28 U/L — SIGNIFICANT CHANGE UP (ref 10–40)
BASE EXCESS BLDV CALC-SCNC: 3.5 MMOL/L — HIGH (ref -2–2)
BILIRUB SERPL-MCNC: 1.3 MG/DL — HIGH (ref 0.2–1.2)
BUN SERPL-MCNC: 19 MG/DL — SIGNIFICANT CHANGE UP (ref 7–23)
CA-I SERPL-SCNC: 1.19 MMOL/L — SIGNIFICANT CHANGE UP (ref 1.15–1.33)
CALCIUM SERPL-MCNC: 8.5 MG/DL — SIGNIFICANT CHANGE UP (ref 8.4–10.5)
CHLORIDE BLDV-SCNC: 106 MMOL/L — SIGNIFICANT CHANGE UP (ref 96–108)
CHLORIDE SERPL-SCNC: 106 MMOL/L — SIGNIFICANT CHANGE UP (ref 96–108)
CO2 BLDV-SCNC: 31 MMOL/L — HIGH (ref 22–26)
CO2 SERPL-SCNC: 21 MMOL/L — LOW (ref 22–31)
CREAT SERPL-MCNC: 0.69 MG/DL — SIGNIFICANT CHANGE UP (ref 0.5–1.3)
EGFR: 106 ML/MIN/1.73M2 — SIGNIFICANT CHANGE UP
GAS PNL BLDA: SIGNIFICANT CHANGE UP
GAS PNL BLDV: 136 MMOL/L — SIGNIFICANT CHANGE UP (ref 136–145)
GAS PNL BLDV: SIGNIFICANT CHANGE UP
GLUCOSE BLDC GLUCOMTR-MCNC: 103 MG/DL — HIGH (ref 70–99)
GLUCOSE BLDC GLUCOMTR-MCNC: 107 MG/DL — HIGH (ref 70–99)
GLUCOSE BLDC GLUCOMTR-MCNC: 114 MG/DL — HIGH (ref 70–99)
GLUCOSE BLDC GLUCOMTR-MCNC: 114 MG/DL — HIGH (ref 70–99)
GLUCOSE BLDC GLUCOMTR-MCNC: 115 MG/DL — HIGH (ref 70–99)
GLUCOSE BLDC GLUCOMTR-MCNC: 115 MG/DL — HIGH (ref 70–99)
GLUCOSE BLDC GLUCOMTR-MCNC: 116 MG/DL — HIGH (ref 70–99)
GLUCOSE BLDC GLUCOMTR-MCNC: 116 MG/DL — HIGH (ref 70–99)
GLUCOSE BLDC GLUCOMTR-MCNC: 119 MG/DL — HIGH (ref 70–99)
GLUCOSE BLDC GLUCOMTR-MCNC: 119 MG/DL — HIGH (ref 70–99)
GLUCOSE BLDC GLUCOMTR-MCNC: 127 MG/DL — HIGH (ref 70–99)
GLUCOSE BLDC GLUCOMTR-MCNC: 129 MG/DL — HIGH (ref 70–99)
GLUCOSE BLDC GLUCOMTR-MCNC: 135 MG/DL — HIGH (ref 70–99)
GLUCOSE BLDV-MCNC: 133 MG/DL — HIGH (ref 70–99)
GLUCOSE SERPL-MCNC: 116 MG/DL — HIGH (ref 70–99)
HCO3 BLDV-SCNC: 30 MMOL/L — HIGH (ref 22–29)
HCT VFR BLD CALC: 30.1 % — LOW (ref 39–50)
HCT VFR BLDA CALC: 30 % — LOW (ref 39–51)
HGB BLD CALC-MCNC: 10.1 G/DL — LOW (ref 12.6–17.4)
HGB BLD-MCNC: 10.4 G/DL — LOW (ref 13–17)
HOROWITZ INDEX BLDV+IHG-RTO: 40 — SIGNIFICANT CHANGE UP
LACTATE BLDV-MCNC: 1.1 MMOL/L — SIGNIFICANT CHANGE UP (ref 0.7–2)
MAGNESIUM SERPL-MCNC: 2.7 MG/DL — HIGH (ref 1.6–2.6)
MCHC RBC-ENTMCNC: 30.5 PG — SIGNIFICANT CHANGE UP (ref 27–34)
MCHC RBC-ENTMCNC: 34.6 GM/DL — SIGNIFICANT CHANGE UP (ref 32–36)
MCV RBC AUTO: 88.3 FL — SIGNIFICANT CHANGE UP (ref 80–100)
NRBC # BLD: 0 /100 WBCS — SIGNIFICANT CHANGE UP (ref 0–0)
PCO2 BLDV: 51 MMHG — SIGNIFICANT CHANGE UP (ref 42–55)
PH BLDV: 7.37 — SIGNIFICANT CHANGE UP (ref 7.32–7.43)
PHOSPHATE SERPL-MCNC: 3.7 MG/DL — SIGNIFICANT CHANGE UP (ref 2.5–4.5)
PLATELET # BLD AUTO: 189 K/UL — SIGNIFICANT CHANGE UP (ref 150–400)
PO2 BLDV: 52 MMHG — HIGH (ref 25–45)
POTASSIUM BLDV-SCNC: 4.1 MMOL/L — SIGNIFICANT CHANGE UP (ref 3.5–5.1)
POTASSIUM BLDV-SCNC: 4.5 MMOL/L — SIGNIFICANT CHANGE UP (ref 3.5–5.1)
POTASSIUM SERPL-MCNC: 4.4 MMOL/L — SIGNIFICANT CHANGE UP (ref 3.5–5.3)
POTASSIUM SERPL-SCNC: 4.4 MMOL/L — SIGNIFICANT CHANGE UP (ref 3.5–5.3)
PROT SERPL-MCNC: 6.3 G/DL — SIGNIFICANT CHANGE UP (ref 6–8.3)
RBC # BLD: 3.41 M/UL — LOW (ref 4.2–5.8)
RBC # FLD: 12.4 % — SIGNIFICANT CHANGE UP (ref 10.3–14.5)
SAO2 % BLDV: 83.4 % — SIGNIFICANT CHANGE UP (ref 67–88)
SODIUM SERPL-SCNC: 140 MMOL/L — SIGNIFICANT CHANGE UP (ref 135–145)
TSH SERPL-MCNC: 0.59 UIU/ML — SIGNIFICANT CHANGE UP (ref 0.27–4.2)
WBC # BLD: 15.48 K/UL — HIGH (ref 3.8–10.5)
WBC # FLD AUTO: 15.48 K/UL — HIGH (ref 3.8–10.5)

## 2022-03-19 PROCEDURE — 93010 ELECTROCARDIOGRAM REPORT: CPT

## 2022-03-19 PROCEDURE — 99291 CRITICAL CARE FIRST HOUR: CPT

## 2022-03-19 PROCEDURE — 71045 X-RAY EXAM CHEST 1 VIEW: CPT | Mod: 26

## 2022-03-19 RX ORDER — METOPROLOL TARTRATE 50 MG
50 TABLET ORAL
Refills: 0 | Status: DISCONTINUED | OUTPATIENT
Start: 2022-03-19 | End: 2022-03-21

## 2022-03-19 RX ORDER — TRAZODONE HCL 50 MG
50 TABLET ORAL AT BEDTIME
Refills: 0 | Status: DISCONTINUED | OUTPATIENT
Start: 2022-03-19 | End: 2022-03-23

## 2022-03-19 RX ORDER — PANTOPRAZOLE SODIUM 20 MG/1
40 TABLET, DELAYED RELEASE ORAL
Refills: 0 | Status: DISCONTINUED | OUTPATIENT
Start: 2022-03-19 | End: 2022-03-23

## 2022-03-19 RX ORDER — MAGNESIUM SULFATE 500 MG/ML
2 VIAL (ML) INJECTION ONCE
Refills: 0 | Status: COMPLETED | OUTPATIENT
Start: 2022-03-19 | End: 2022-03-19

## 2022-03-19 RX ORDER — ACETAMINOPHEN 500 MG
1000 TABLET ORAL ONCE
Refills: 0 | Status: COMPLETED | OUTPATIENT
Start: 2022-03-19 | End: 2022-03-20

## 2022-03-19 RX ORDER — INSULIN LISPRO 100/ML
VIAL (ML) SUBCUTANEOUS
Refills: 0 | Status: DISCONTINUED | OUTPATIENT
Start: 2022-03-19 | End: 2022-03-21

## 2022-03-19 RX ORDER — METOPROLOL TARTRATE 50 MG
25 TABLET ORAL
Refills: 0 | Status: DISCONTINUED | OUTPATIENT
Start: 2022-03-19 | End: 2022-03-19

## 2022-03-19 RX ORDER — ACETAMINOPHEN 500 MG
1000 TABLET ORAL ONCE
Refills: 0 | Status: COMPLETED | OUTPATIENT
Start: 2022-03-19 | End: 2022-03-19

## 2022-03-19 RX ORDER — ATORVASTATIN CALCIUM 80 MG/1
80 TABLET, FILM COATED ORAL AT BEDTIME
Refills: 0 | Status: DISCONTINUED | OUTPATIENT
Start: 2022-03-19 | End: 2022-03-23

## 2022-03-19 RX ORDER — MAGNESIUM SULFATE 500 MG/ML
1 VIAL (ML) INJECTION ONCE
Refills: 0 | Status: COMPLETED | OUTPATIENT
Start: 2022-03-19 | End: 2022-03-19

## 2022-03-19 RX ORDER — POTASSIUM CHLORIDE 20 MEQ
10 PACKET (EA) ORAL ONCE
Refills: 0 | Status: COMPLETED | OUTPATIENT
Start: 2022-03-19 | End: 2022-03-20

## 2022-03-19 RX ORDER — INSULIN LISPRO 100/ML
VIAL (ML) SUBCUTANEOUS AT BEDTIME
Refills: 0 | Status: DISCONTINUED | OUTPATIENT
Start: 2022-03-19 | End: 2022-03-21

## 2022-03-19 RX ORDER — ENOXAPARIN SODIUM 100 MG/ML
40 INJECTION SUBCUTANEOUS EVERY 24 HOURS
Refills: 0 | Status: DISCONTINUED | OUTPATIENT
Start: 2022-03-19 | End: 2022-03-23

## 2022-03-19 RX ORDER — METOPROLOL TARTRATE 50 MG
25 TABLET ORAL ONCE
Refills: 0 | Status: COMPLETED | OUTPATIENT
Start: 2022-03-19 | End: 2022-03-19

## 2022-03-19 RX ORDER — METOCLOPRAMIDE HCL 10 MG
10 TABLET ORAL EVERY 8 HOURS
Refills: 0 | Status: COMPLETED | OUTPATIENT
Start: 2022-03-19 | End: 2022-03-20

## 2022-03-19 RX ADMIN — ENOXAPARIN SODIUM 40 MILLIGRAM(S): 100 INJECTION SUBCUTANEOUS at 11:34

## 2022-03-19 RX ADMIN — Medication 650 MILLIGRAM(S): at 17:15

## 2022-03-19 RX ADMIN — Medication 10 MILLIGRAM(S): at 13:12

## 2022-03-19 RX ADMIN — ATORVASTATIN CALCIUM 40 MILLIGRAM(S): 80 TABLET, FILM COATED ORAL at 05:01

## 2022-03-19 RX ADMIN — Medication 10 MILLIGRAM(S): at 22:09

## 2022-03-19 RX ADMIN — Medication 100 MILLIGRAM(S): at 08:06

## 2022-03-19 RX ADMIN — Medication 100 MILLIGRAM(S): at 00:50

## 2022-03-19 RX ADMIN — AMIODARONE HYDROCHLORIDE 400 MILLIGRAM(S): 400 TABLET ORAL at 05:02

## 2022-03-19 RX ADMIN — Medication 25 MILLIGRAM(S): at 06:30

## 2022-03-19 RX ADMIN — Medication 25 GRAM(S): at 18:44

## 2022-03-19 RX ADMIN — GABAPENTIN 100 MILLIGRAM(S): 400 CAPSULE ORAL at 13:12

## 2022-03-19 RX ADMIN — Medication 10 MILLIGRAM(S): at 05:01

## 2022-03-19 RX ADMIN — GABAPENTIN 100 MILLIGRAM(S): 400 CAPSULE ORAL at 05:02

## 2022-03-19 RX ADMIN — CHLORHEXIDINE GLUCONATE 1 APPLICATION(S): 213 SOLUTION TOPICAL at 22:08

## 2022-03-19 RX ADMIN — Medication 650 MILLIGRAM(S): at 18:15

## 2022-03-19 RX ADMIN — HYDROMORPHONE HYDROCHLORIDE 30 MILLILITER(S): 2 INJECTION INTRAMUSCULAR; INTRAVENOUS; SUBCUTANEOUS at 07:09

## 2022-03-19 RX ADMIN — Medication 1000 MILLIGRAM(S): at 03:40

## 2022-03-19 RX ADMIN — Medication 81 MILLIGRAM(S): at 11:34

## 2022-03-19 RX ADMIN — FAMOTIDINE 20 MILLIGRAM(S): 10 INJECTION INTRAVENOUS at 05:00

## 2022-03-19 RX ADMIN — HYDROMORPHONE HYDROCHLORIDE 30 MILLILITER(S): 2 INJECTION INTRAMUSCULAR; INTRAVENOUS; SUBCUTANEOUS at 03:00

## 2022-03-19 RX ADMIN — Medication 650 MILLIGRAM(S): at 12:34

## 2022-03-19 RX ADMIN — Medication 100 GRAM(S): at 23:36

## 2022-03-19 RX ADMIN — PANTOPRAZOLE SODIUM 40 MILLIGRAM(S): 20 TABLET, DELAYED RELEASE ORAL at 08:06

## 2022-03-19 RX ADMIN — Medication 2: at 11:47

## 2022-03-19 RX ADMIN — POLYETHYLENE GLYCOL 3350 17 GRAM(S): 17 POWDER, FOR SOLUTION ORAL at 11:35

## 2022-03-19 RX ADMIN — Medication 500 MILLIGRAM(S): at 05:02

## 2022-03-19 RX ADMIN — GABAPENTIN 100 MILLIGRAM(S): 400 CAPSULE ORAL at 22:08

## 2022-03-19 RX ADMIN — Medication 650 MILLIGRAM(S): at 11:34

## 2022-03-19 RX ADMIN — AMIODARONE HYDROCHLORIDE 400 MILLIGRAM(S): 400 TABLET ORAL at 17:15

## 2022-03-19 RX ADMIN — Medication 25 MILLIGRAM(S): at 20:20

## 2022-03-19 RX ADMIN — ATORVASTATIN CALCIUM 80 MILLIGRAM(S): 80 TABLET, FILM COATED ORAL at 22:08

## 2022-03-19 RX ADMIN — Medication 100 MILLIGRAM(S): at 15:50

## 2022-03-19 RX ADMIN — Medication 500 MILLIGRAM(S): at 17:58

## 2022-03-19 RX ADMIN — Medication 25 MILLIGRAM(S): at 17:15

## 2022-03-19 RX ADMIN — Medication 400 MILLIGRAM(S): at 03:10

## 2022-03-19 RX ADMIN — HYDROMORPHONE HYDROCHLORIDE 30 MILLILITER(S): 2 INJECTION INTRAMUSCULAR; INTRAVENOUS; SUBCUTANEOUS at 19:24

## 2022-03-19 NOTE — PHYSICAL THERAPY INITIAL EVALUATION ADULT - GENERAL OBSERVATIONS, REHAB EVAL
Rec'd seated in bedside chair, +ICU monitoring, +2L NCO2, +chest tube x2, +ext pacer, +arnold, +PAC pump, +RIJ

## 2022-03-19 NOTE — PHYSICAL THERAPY INITIAL EVALUATION ADULT - NAME OF CLINICIAN
[FreeTextEntry1] : 37 year old woman  who delivered at 39 weeks with Postpartum PEC\par Currently on Nifedipine 30 mg daily\par Can hold if BP less than 120/80\par Check BP prior to taking med\par WIll email me with questions\par FU in 1 months covering NANETTE Taylor

## 2022-03-19 NOTE — PHYSICAL THERAPY INITIAL EVALUATION ADULT - PERTINENT HX OF CURRENT PROBLEM, REHAB EVAL
61 y.o. M PMH of HTN, HLD, c/o chest tightness on exertion, s/p cath 3/9/22 revealed + CAD. Now s/p CABG x2 on 3/19/22.

## 2022-03-19 NOTE — PHYSICAL THERAPY INITIAL EVALUATION ADULT - RISK REDUCTION/PREVENTION, PT EVAL
Patient notified and transferred to scheduling to make appointment.    Rupal Fleming RN     risk factors

## 2022-03-19 NOTE — PROGRESS NOTE ADULT - SUBJECTIVE AND OBJECTIVE BOX
Day 1 of Anesthesia Pain Management Service    SUBJECTIVE: Patient is doing well with IV PCA    Pain Scale Score:	[X] Refer to charted pain scores    THERAPY:    [ ] IV PCA Morphine		[ ] 5 mg/mL	[ ] 1 mg/mL  [X] IV PCA Hydromorphone	[ ] 5 mg/mL	[X] 1 mg/mL  [ ] IV PCA Fentanyl		[ ] 50 micrograms/mL    Demand dose: 0.25 mg     Lockout: 6 minutes   Continuous Rate: 0 mg/hr  4 Hour Limit: 4 mg    MEDICATIONS  (STANDING):  acetaminophen     Tablet .. 650 milliGRAM(s) Oral every 6 hours  aMIOdarone    Tablet 400 milliGRAM(s) Oral two times a day  ascorbic acid 500 milliGRAM(s) Oral two times a day  aspirin enteric coated 81 milliGRAM(s) Oral daily  atorvastatin 80 milliGRAM(s) Oral at bedtime  cefuroxime  IVPB 1500 milliGRAM(s) IV Intermittent every 8 hours  chlorhexidine 2% Cloths 1 Application(s) Topical daily  dextrose 50% Injectable 50 milliLiter(s) IV Push every 15 minutes  dextrose 50% Injectable 25 milliLiter(s) IV Push every 15 minutes  enoxaparin Injectable 40 milliGRAM(s) SubCutaneous every 24 hours  gabapentin 100 milliGRAM(s) Oral every 8 hours  HYDROmorphone PCA (1 mG/mL) 30 milliLiter(s) PCA Continuous PCA Continuous  insulin regular Infusion 3 Unit(s)/Hr (3 mL/Hr) IV Continuous <Continuous>  metoclopramide Injectable 10 milliGRAM(s) IV Push every 8 hours  metoprolol tartrate 25 milliGRAM(s) Oral two times a day  niCARdipine Infusion 5 mG/Hr (25 mL/Hr) IV Continuous <Continuous>  pantoprazole    Tablet 40 milliGRAM(s) Oral before breakfast  polyethylene glycol 3350 17 Gram(s) Oral daily  potassium chloride  10 mEq/50 mL IVPB 10 milliEquivalent(s) IV Intermittent every 1 hour  potassium chloride  10 mEq/50 mL IVPB 10 milliEquivalent(s) IV Intermittent every 1 hour  potassium chloride  10 mEq/50 mL IVPB 10 milliEquivalent(s) IV Intermittent every 1 hour  senna 2 Tablet(s) Oral at bedtime  sodium chloride 0.9%. 1000 milliLiter(s) (10 mL/Hr) IV Continuous <Continuous>  sodium chloride 0.9%. 1000 milliLiter(s) (30 mL/Hr) IV Continuous <Continuous>    MEDICATIONS  (PRN):  HYDROmorphone  Injectable 0.5 milliGRAM(s) IV Push every 6 hours PRN Severe Pain (7 - 10)  HYDROmorphone PCA (1 mG/mL) Rescue Clinician Bolus 0.5 milliGRAM(s) IV Push every 15 minutes PRN For pain scale greater than 6  oxyCODONE    IR 5 milliGRAM(s) Oral every 4 hours PRN Moderate Pain (4 - 6)  oxyCODONE    IR 10 milliGRAM(s) Oral every 4 hours PRN Severe Pain (7 - 10)  traZODone 50 milliGRAM(s) Oral at bedtime PRN Sleep      OBJECTIVE:    Sedation Score:	[ X] Alert	[ ] Drowsy 	[ ] Arousable	[ ] Asleep	[ ] Unresponsive    Side Effects:	[X ] None	[ ] Nausea	[ ] Vomiting	[ ] Pruritus  		[ ] Other:    Vital Signs Last 24 Hrs  T(C): 37.3 (19 Mar 2022 08:00), Max: 37.5 (19 Mar 2022 04:00)  T(F): 99.1 (19 Mar 2022 08:00), Max: 99.5 (19 Mar 2022 04:00)  HR: 89 (19 Mar 2022 10:00) (72 - 98)  BP: 114/67 (19 Mar 2022 10:00) (103/58 - 118/66)  BP(mean): 85 (19 Mar 2022 10:00) (76 - 86)  RR: 25 (19 Mar 2022 10:00) (12 - 42)  SpO2: 99% (19 Mar 2022 10:00) (96% - 100%)    ASSESSMENT/ PLAN    Therapy to  be:               [X] Continued   [ ] Discontinued   [ ] Changed to PRN Analgesics    Documentation and Verification of current medications:   [X] Done	[ ] Not done, not eligible    Comments: Patient doing well continue current therapy

## 2022-03-19 NOTE — PHYSICAL THERAPY INITIAL EVALUATION ADULT - ADDITIONAL COMMENTS
Pt resides in a pvt home w/ father, 1 step to enter, ~6 steps inside. PTA pt was independent w/ all functional mobility & did not use an AD for ambulation.

## 2022-03-19 NOTE — PHYSICAL THERAPY INITIAL EVALUATION ADULT - PLANNED THERAPY INTERVENTIONS, PT EVAL
1. GOAL: In 2 weeks, pt will be able to navigate 6 steps independently./bed mobility training/gait training/transfer training

## 2022-03-19 NOTE — PROGRESS NOTE ADULT - SUBJECTIVE AND OBJECTIVE BOX
Patient seen and examined at the bedside.    Remained critically ill on continuous ICU monitoring.    OBJECTIVE:  Vital Signs Last 24 Hrs  T(C): 37.5 (19 Mar 2022 04:00), Max: 37.5 (19 Mar 2022 04:00)  T(F): 99.5 (19 Mar 2022 04:00), Max: 99.5 (19 Mar 2022 04:00)  HR: 93 (19 Mar 2022 07:00) (72 - 98)  BP: 111/62 (19 Mar 2022 07:00) (111/62 - 134/77)  BP(mean): 82 (19 Mar 2022 07:00) (81 - 86)  RR: 18 (19 Mar 2022 07:00) (12 - 42)  SpO2: 99% (19 Mar 2022 07:00) (96% - 100%)      Physical Exam:   General: intubated multiple lines gtt & tubes   Neurology: nonfocal  Eyes: bilateral pupils equal and reactive   ENT/Neck: +ETT midline, Neck supple, trachea midline, No JVD   Respiratory: Clear bilaterally   CV: RRR, S1S2, no murmurs        [] Sternal dressing, [] Mediastinal CT, [] Pleural CT, [] YARIEL drain        [] Sinus rhythm, [] Afib, [] Temporary pacing, [] PPM  Abdominal: Soft, NT, ND +BS,   Extremities: 1-2+ pedal edema noted, + peripheral pulses   Skin: No Rashes, Hematoma, Ecchymosis                           Assessment:  This is a 60 y/o  male with PMH of HTN, HLD, c/o chest tightness on exertion, s/p cath 3/9/22 revealed + CAD , he presents today for  coronary artery bypass grafting x 3 on  3/18/22    CAD s/p CABG x3 3/18   HTN   Hyperlipidemia     Plan:   ***Neuro***  [x] Nonfocal    Post operative neuro assessment     ***Cardiovascular***  Invasive hemodynamic monitoring, assess perfusion indices   SR / CVP 0/ Hct 30.1%/ Lactate 0.7  Reassessment of hemodynamics post resuscitation   Afib continue with amio   Blood pressure management with IV Cardene   Lopressor for rate control   Monitor chest tube outputs  [x] ASA [x] Statin   Serial EKG and cardiac enzymes     ***Pulmonary***  Post op vent management   Titration of FiO2 and PEEP, follow SpO2, CXR, blood gasses     Mode: CPAP with PS  FiO2: 50  PEEP: 5  PS: 5  MAP: 8  PIP: 17              Will plan to wean & extubate once pt is hemodynamically stable and not bleeding    ***GI***  [x] NPO will advance as tolerated   [x] Protonix   Bowel regimen with Miralax and Senna     ***Renal***  Continue to monitor I/Os, BUN/Creatinine.   Replete lytes PRN  [place] present [x ] positive       ***ID***  Perioperative coverage with Cefuroxime     ***Endocrine***  [] Hyperglycemia  : HbA1c 5.4%              - [x] Insulin gtt               - Need tight glycemic control to prevent wound infection.              Patient requires continuous monitoring with bedside rhythm monitoring, pulse oximetry monitoring, and continuous central venous and arterial pressure monitoring; and intermittent blood gas analysis. Care plan discussed with the ICU care team.   Patient remained critical, at risk for life threatening decompensation.    I have spent 30 minutes providing critical care management to this patient.    By signing my name below, I, Antonia Noyola, attest that this documentation has been prepared under the direction and in the presence of Kenji Sarmiento MD   Electronically signed: Vi England, 03-19-22 @ 08:18    I, Kenji Sarmiento, personally performed the services described in this documentation. all medical record entries made by the scribe were at my direction and in my presence. I have reviewed the chart and agree that the record reflects my personal performance and is accurate and complete  Electronically signed: Kenji Sarmiento MD

## 2022-03-20 LAB
ALBUMIN SERPL ELPH-MCNC: 3.9 G/DL — SIGNIFICANT CHANGE UP (ref 3.3–5)
ALBUMIN SERPL ELPH-MCNC: 4.1 G/DL — SIGNIFICANT CHANGE UP (ref 3.3–5)
ALP SERPL-CCNC: 33 U/L — LOW (ref 40–120)
ALP SERPL-CCNC: 35 U/L — LOW (ref 40–120)
ALT FLD-CCNC: 12 U/L — SIGNIFICANT CHANGE UP (ref 10–45)
ALT FLD-CCNC: 15 U/L — SIGNIFICANT CHANGE UP (ref 10–45)
ANION GAP SERPL CALC-SCNC: 1 MMOL/L — LOW (ref 5–17)
ANION GAP SERPL CALC-SCNC: 12 MMOL/L — SIGNIFICANT CHANGE UP (ref 5–17)
AST SERPL-CCNC: 29 U/L — SIGNIFICANT CHANGE UP (ref 10–40)
AST SERPL-CCNC: 32 U/L — SIGNIFICANT CHANGE UP (ref 10–40)
BILIRUB SERPL-MCNC: 1.1 MG/DL — SIGNIFICANT CHANGE UP (ref 0.2–1.2)
BILIRUB SERPL-MCNC: 1.2 MG/DL — SIGNIFICANT CHANGE UP (ref 0.2–1.2)
BUN SERPL-MCNC: 19 MG/DL — SIGNIFICANT CHANGE UP (ref 7–23)
BUN SERPL-MCNC: 19 MG/DL — SIGNIFICANT CHANGE UP (ref 7–23)
CALCIUM SERPL-MCNC: 7.8 MG/DL — LOW (ref 8.4–10.5)
CALCIUM SERPL-MCNC: 8.4 MG/DL — SIGNIFICANT CHANGE UP (ref 8.4–10.5)
CHLORIDE SERPL-SCNC: 102 MMOL/L — SIGNIFICANT CHANGE UP (ref 96–108)
CHLORIDE SERPL-SCNC: 103 MMOL/L — SIGNIFICANT CHANGE UP (ref 96–108)
CK MB BLD-MCNC: 0.7 % — SIGNIFICANT CHANGE UP (ref 0–3.5)
CK MB CFR SERPL CALC: 5.7 NG/ML — SIGNIFICANT CHANGE UP (ref 0–6.7)
CK SERPL-CCNC: 772 U/L — HIGH (ref 30–200)
CO2 SERPL-SCNC: 25 MMOL/L — SIGNIFICANT CHANGE UP (ref 22–31)
CO2 SERPL-SCNC: 25 MMOL/L — SIGNIFICANT CHANGE UP (ref 22–31)
CREAT SERPL-MCNC: 0.66 MG/DL — SIGNIFICANT CHANGE UP (ref 0.5–1.3)
CREAT SERPL-MCNC: 0.7 MG/DL — SIGNIFICANT CHANGE UP (ref 0.5–1.3)
EGFR: 105 ML/MIN/1.73M2 — SIGNIFICANT CHANGE UP
EGFR: 107 ML/MIN/1.73M2 — SIGNIFICANT CHANGE UP
GLUCOSE BLDC GLUCOMTR-MCNC: 137 MG/DL — HIGH (ref 70–99)
GLUCOSE BLDC GLUCOMTR-MCNC: 144 MG/DL — HIGH (ref 70–99)
GLUCOSE BLDC GLUCOMTR-MCNC: 144 MG/DL — HIGH (ref 70–99)
GLUCOSE BLDC GLUCOMTR-MCNC: 158 MG/DL — HIGH (ref 70–99)
GLUCOSE SERPL-MCNC: 145 MG/DL — HIGH (ref 70–99)
GLUCOSE SERPL-MCNC: 280 MG/DL — HIGH (ref 70–99)
HCT VFR BLD CALC: 29.7 % — LOW (ref 39–50)
HGB BLD-MCNC: 9.6 G/DL — LOW (ref 13–17)
MAGNESIUM SERPL-MCNC: 2.8 MG/DL — HIGH (ref 1.6–2.6)
MAGNESIUM SERPL-MCNC: 6.7 MG/DL — HIGH (ref 1.6–2.6)
MCHC RBC-ENTMCNC: 30 PG — SIGNIFICANT CHANGE UP (ref 27–34)
MCHC RBC-ENTMCNC: 32.3 GM/DL — SIGNIFICANT CHANGE UP (ref 32–36)
MCV RBC AUTO: 92.8 FL — SIGNIFICANT CHANGE UP (ref 80–100)
NRBC # BLD: 0 /100 WBCS — SIGNIFICANT CHANGE UP (ref 0–0)
PHOSPHATE SERPL-MCNC: 1.8 MG/DL — LOW (ref 2.5–4.5)
PHOSPHATE SERPL-MCNC: 2.2 MG/DL — LOW (ref 2.5–4.5)
PLATELET # BLD AUTO: 218 K/UL — SIGNIFICANT CHANGE UP (ref 150–400)
POTASSIUM SERPL-MCNC: 4.4 MMOL/L — SIGNIFICANT CHANGE UP (ref 3.5–5.3)
POTASSIUM SERPL-MCNC: >9 MMOL/L — CRITICAL HIGH (ref 3.5–5.3)
POTASSIUM SERPL-SCNC: 4.4 MMOL/L — SIGNIFICANT CHANGE UP (ref 3.5–5.3)
POTASSIUM SERPL-SCNC: >9 MMOL/L — CRITICAL HIGH (ref 3.5–5.3)
PROT SERPL-MCNC: 5.8 G/DL — LOW (ref 6–8.3)
PROT SERPL-MCNC: 6.1 G/DL — SIGNIFICANT CHANGE UP (ref 6–8.3)
RBC # BLD: 3.2 M/UL — LOW (ref 4.2–5.8)
RBC # FLD: 13 % — SIGNIFICANT CHANGE UP (ref 10.3–14.5)
SODIUM SERPL-SCNC: 128 MMOL/L — LOW (ref 135–145)
SODIUM SERPL-SCNC: 140 MMOL/L — SIGNIFICANT CHANGE UP (ref 135–145)
TROPONIN T, HIGH SENSITIVITY RESULT: 172 NG/L — HIGH (ref 0–51)
WBC # BLD: 21.95 K/UL — HIGH (ref 3.8–10.5)
WBC # FLD AUTO: 21.95 K/UL — HIGH (ref 3.8–10.5)

## 2022-03-20 PROCEDURE — 71045 X-RAY EXAM CHEST 1 VIEW: CPT | Mod: 26,76,59

## 2022-03-20 PROCEDURE — 99291 CRITICAL CARE FIRST HOUR: CPT

## 2022-03-20 RX ORDER — SODIUM CHLORIDE 9 MG/ML
3 INJECTION INTRAMUSCULAR; INTRAVENOUS; SUBCUTANEOUS EVERY 8 HOURS
Refills: 0 | Status: DISCONTINUED | OUTPATIENT
Start: 2022-03-20 | End: 2022-03-23

## 2022-03-20 RX ORDER — METOPROLOL TARTRATE 50 MG
5 TABLET ORAL ONCE
Refills: 0 | Status: COMPLETED | OUTPATIENT
Start: 2022-03-20 | End: 2022-03-20

## 2022-03-20 RX ORDER — DEXMEDETOMIDINE HYDROCHLORIDE IN 0.9% SODIUM CHLORIDE 4 UG/ML
0.5 INJECTION INTRAVENOUS
Qty: 200 | Refills: 0 | Status: DISCONTINUED | OUTPATIENT
Start: 2022-03-20 | End: 2022-03-20

## 2022-03-20 RX ORDER — FUROSEMIDE 40 MG
20 TABLET ORAL ONCE
Refills: 0 | Status: COMPLETED | OUTPATIENT
Start: 2022-03-20 | End: 2022-03-20

## 2022-03-20 RX ORDER — FENTANYL CITRATE 50 UG/ML
50 INJECTION INTRAVENOUS ONCE
Refills: 0 | Status: DISCONTINUED | OUTPATIENT
Start: 2022-03-20 | End: 2022-03-20

## 2022-03-20 RX ADMIN — SENNA PLUS 2 TABLET(S): 8.6 TABLET ORAL at 21:01

## 2022-03-20 RX ADMIN — Medication 50 MILLIGRAM(S): at 17:07

## 2022-03-20 RX ADMIN — POLYETHYLENE GLYCOL 3350 17 GRAM(S): 17 POWDER, FOR SOLUTION ORAL at 11:55

## 2022-03-20 RX ADMIN — Medication 100 MILLIGRAM(S): at 00:22

## 2022-03-20 RX ADMIN — Medication 650 MILLIGRAM(S): at 17:07

## 2022-03-20 RX ADMIN — Medication 500 MILLIGRAM(S): at 05:23

## 2022-03-20 RX ADMIN — GABAPENTIN 100 MILLIGRAM(S): 400 CAPSULE ORAL at 21:01

## 2022-03-20 RX ADMIN — Medication 62.5 MILLIMOLE(S): at 06:55

## 2022-03-20 RX ADMIN — AMIODARONE HYDROCHLORIDE 400 MILLIGRAM(S): 400 TABLET ORAL at 05:23

## 2022-03-20 RX ADMIN — Medication 10 MILLIGRAM(S): at 05:24

## 2022-03-20 RX ADMIN — Medication 650 MILLIGRAM(S): at 05:23

## 2022-03-20 RX ADMIN — Medication 2: at 16:41

## 2022-03-20 RX ADMIN — HYDROMORPHONE HYDROCHLORIDE 30 MILLILITER(S): 2 INJECTION INTRAMUSCULAR; INTRAVENOUS; SUBCUTANEOUS at 16:36

## 2022-03-20 RX ADMIN — HYDROMORPHONE HYDROCHLORIDE 30 MILLILITER(S): 2 INJECTION INTRAMUSCULAR; INTRAVENOUS; SUBCUTANEOUS at 18:48

## 2022-03-20 RX ADMIN — Medication 650 MILLIGRAM(S): at 05:53

## 2022-03-20 RX ADMIN — GABAPENTIN 100 MILLIGRAM(S): 400 CAPSULE ORAL at 05:24

## 2022-03-20 RX ADMIN — CHLORHEXIDINE GLUCONATE 1 APPLICATION(S): 213 SOLUTION TOPICAL at 12:14

## 2022-03-20 RX ADMIN — Medication 1000 MILLIGRAM(S): at 00:49

## 2022-03-20 RX ADMIN — PANTOPRAZOLE SODIUM 40 MILLIGRAM(S): 20 TABLET, DELAYED RELEASE ORAL at 06:23

## 2022-03-20 RX ADMIN — Medication 400 MILLIGRAM(S): at 00:19

## 2022-03-20 RX ADMIN — ATORVASTATIN CALCIUM 80 MILLIGRAM(S): 80 TABLET, FILM COATED ORAL at 21:01

## 2022-03-20 RX ADMIN — Medication 50 MILLIGRAM(S): at 05:24

## 2022-03-20 RX ADMIN — Medication 20 MILLIGRAM(S): at 03:15

## 2022-03-20 RX ADMIN — SODIUM CHLORIDE 3 MILLILITER(S): 9 INJECTION INTRAMUSCULAR; INTRAVENOUS; SUBCUTANEOUS at 21:00

## 2022-03-20 RX ADMIN — Medication 10 MILLIGRAM(S): at 14:31

## 2022-03-20 RX ADMIN — Medication 5 MILLIGRAM(S): at 04:30

## 2022-03-20 RX ADMIN — Medication 650 MILLIGRAM(S): at 17:13

## 2022-03-20 RX ADMIN — Medication 2: at 11:55

## 2022-03-20 RX ADMIN — HYDROMORPHONE HYDROCHLORIDE 30 MILLILITER(S): 2 INJECTION INTRAMUSCULAR; INTRAVENOUS; SUBCUTANEOUS at 07:16

## 2022-03-20 RX ADMIN — HYDROMORPHONE HYDROCHLORIDE 30 MILLILITER(S): 2 INJECTION INTRAMUSCULAR; INTRAVENOUS; SUBCUTANEOUS at 00:17

## 2022-03-20 RX ADMIN — Medication 81 MILLIGRAM(S): at 12:14

## 2022-03-20 RX ADMIN — FENTANYL CITRATE 50 MICROGRAM(S): 50 INJECTION INTRAVENOUS at 04:15

## 2022-03-20 RX ADMIN — Medication 10 MILLIGRAM(S): at 21:01

## 2022-03-20 RX ADMIN — Medication 5: at 07:25

## 2022-03-20 RX ADMIN — Medication 500 MILLIGRAM(S): at 17:07

## 2022-03-20 RX ADMIN — Medication 650 MILLIGRAM(S): at 12:00

## 2022-03-20 RX ADMIN — Medication 50 MILLIEQUIVALENT(S): at 00:00

## 2022-03-20 RX ADMIN — AMIODARONE HYDROCHLORIDE 400 MILLIGRAM(S): 400 TABLET ORAL at 17:07

## 2022-03-20 RX ADMIN — GABAPENTIN 100 MILLIGRAM(S): 400 CAPSULE ORAL at 14:31

## 2022-03-20 RX ADMIN — FENTANYL CITRATE 50 MICROGRAM(S): 50 INJECTION INTRAVENOUS at 04:30

## 2022-03-20 RX ADMIN — ENOXAPARIN SODIUM 40 MILLIGRAM(S): 100 INJECTION SUBCUTANEOUS at 11:55

## 2022-03-20 NOTE — PROGRESS NOTE ADULT - SUBJECTIVE AND OBJECTIVE BOX
Day 2 of Anesthesia Pain Management Service    SUBJECTIVE: Patient is doing well with IV PCA    Pain Scale Score:	[X] Refer to charted pain scores    THERAPY:    [ ] IV PCA Morphine		[ ] 5 mg/mL	[ ] 1 mg/mL  [X] IV PCA Hydromorphone	[ ] 5 mg/mL	[X] 1 mg/mL  [ ] IV PCA Fentanyl		[ ] 50 micrograms/mL    Demand dose: 0.25 mg     Lockout: 6 minutes   Continuous Rate: 0 mg/hr  4 Hour Limit: 4 mg    MEDICATIONS  (STANDING):  acetaminophen     Tablet .. 650 milliGRAM(s) Oral every 6 hours  aMIOdarone    Tablet 400 milliGRAM(s) Oral two times a day  ascorbic acid 500 milliGRAM(s) Oral two times a day  aspirin enteric coated 81 milliGRAM(s) Oral daily  atorvastatin 80 milliGRAM(s) Oral at bedtime  bisacodyl Suppository 10 milliGRAM(s) Rectal once  chlorhexidine 2% Cloths 1 Application(s) Topical daily  dexMEDEtomidine Infusion 0.5 MICROgram(s)/kG/Hr (10.9 mL/Hr) IV Continuous <Continuous>  enoxaparin Injectable 40 milliGRAM(s) SubCutaneous every 24 hours  gabapentin 100 milliGRAM(s) Oral every 8 hours  HYDROmorphone PCA (1 mG/mL) 30 milliLiter(s) PCA Continuous PCA Continuous  insulin lispro (ADMELOG) corrective regimen sliding scale   SubCutaneous three times a day before meals  insulin lispro (ADMELOG) corrective regimen sliding scale   SubCutaneous at bedtime  metoclopramide Injectable 10 milliGRAM(s) IV Push every 8 hours  metoprolol tartrate 50 milliGRAM(s) Oral two times a day  pantoprazole    Tablet 40 milliGRAM(s) Oral before breakfast  polyethylene glycol 3350 17 Gram(s) Oral daily  senna 2 Tablet(s) Oral at bedtime  sodium chloride 0.9%. 1000 milliLiter(s) (30 mL/Hr) IV Continuous <Continuous>    MEDICATIONS  (PRN):  HYDROmorphone PCA (1 mG/mL) Rescue Clinician Bolus 0.5 milliGRAM(s) IV Push every 15 minutes PRN For pain scale greater than 6  traZODone 50 milliGRAM(s) Oral at bedtime PRN Sleep      OBJECTIVE:    Sedation Score:	[ X] Alert	[ ] Drowsy 	[ ] Arousable	[ ] Asleep	[ ] Unresponsive    Side Effects:	[X ] None	[ ] Nausea	[ ] Vomiting	[ ] Pruritus  		[ ] Other:    Vital Signs Last 24 Hrs  T(C): 36.8 (20 Mar 2022 08:00), Max: 37.8 (19 Mar 2022 20:00)  T(F): 98.2 (20 Mar 2022 08:00), Max: 100.1 (19 Mar 2022 20:00)  HR: 77 (20 Mar 2022 09:00) (77 - 105)  BP: 98/56 (20 Mar 2022 08:00) (89/54 - 175/78)  BP(mean): 70 (20 Mar 2022 08:00) (66 - 112)  RR: 18 (20 Mar 2022 09:00) (11 - 38)  SpO2: 91% (20 Mar 2022 09:00) (91% - 99%)    ASSESSMENT/ PLAN    Therapy to  be:               [X] Continued   [ ] Discontinued   [ ] Changed to PRN Analgesics    Documentation and Verification of current medications:   [X] Done	[ ] Not done, not eligible    Comments: Required one rescue dose of fentanyl after ambulating this morning.  Patient comfortable on exam, plan to move to step down today.  Counseled on pain plan and appropriate use of PCA.

## 2022-03-20 NOTE — PROGRESS NOTE ADULT - SUBJECTIVE AND OBJECTIVE BOX
VITAL SIGNS    Telemetry:      Vital Signs Last 24 Hrs  T(C): 36.7 (22 @ 16:45), Max: 37.8 (22 @ 20:00)  T(F): 98.1 (22 @ 16:45), Max: 100.1 (22 @ 20:00)  HR: 91 (22 @ 16:45) (77 - 105)  BP: 137/81 (22 @ 16:45) (89/54 - 175/78)  RR: 18 (22 @ 16:45) (11 - 35)  SpO2: 96% (22 @ 16:45) (91% - 99%)                   Daily     Daily Weight in k.7 (20 Mar 2022 00:00)      Bilirubin Total, Serum: 1.2 mg/dL ( @ 01:44)  Bilirubin Total, Serum: 1.1 mg/dL ( @ 00:19)    CAPILLARY BLOOD GLUCOSE  137 (20 Mar 2022 16:00)  141 (20 Mar 2022 12:00)      POCT Blood Glucose.: 137 mg/dL (20 Mar 2022 16:20)  POCT Blood Glucose.: 144 mg/dL (20 Mar 2022 11:51)  POCT Blood Glucose.: 158 mg/dL (20 Mar 2022 06:43)  POCT Blood Glucose.: 114 mg/dL (19 Mar 2022 21:54)        Pacing Wires       to epm                                  PHYSICAL EXAM    Neurology: alert and oriented x 3, moves all extremities with no defecits  CV :  RRR  Sternal Wound :  CDI , Stable  Lungs:   CTA B/L  Abdomen: soft, nontender, nondistended, positive bowel sounds, last bowel movement   Extremities:                                            VITAL SIGNS    Telemetry:  sr  80    Vital Signs Last 24 Hrs  T(C): 36.7 (22 @ 16:45), Max: 37.8 (22 @ 20:00)  T(F): 98.1 (22 @ 16:45), Max: 100.1 (22 @ 20:00)  HR: 91 (22 @ 16:45) (77 - 105)  BP: 137/81 (22 @ 16:45) (89/54 - 175/78)  RR: 18 (22 @ 16:45) (11 - 35)  SpO2: 96% (22 @ 16:45) (91% - 99%)                   Daily     Daily Weight in k.7 (20 Mar 2022 00:00)      Bilirubin Total, Serum: 1.2 mg/dL ( @ 01:44)  Bilirubin Total, Serum: 1.1 mg/dL ( @ 00:19)    CAPILLARY BLOOD GLUCOSE  137 (20 Mar 2022 16:00)  141 (20 Mar 2022 12:00)      POCT Blood Glucose.: 137 mg/dL (20 Mar 2022 16:20)  POCT Blood Glucose.: 144 mg/dL (20 Mar 2022 11:51)  POCT Blood Glucose.: 158 mg/dL (20 Mar 2022 06:43)  POCT Blood Glucose.: 114 mg/dL (19 Mar 2022 21:54)        Pacing Wires       to epm                                  PHYSICAL EXAM    Neurology: alert and oriented x 3, moves all extremities with no defecits  CV :  RRR  Sternal Wound :  CDI , Stable + PW  Lungs:   CTA B/L  Abdomen: soft, nontender, nondistended, positive bowel sounds, last bowel movement   Extremities:     no pedal edema    lt leg ace

## 2022-03-20 NOTE — PROGRESS NOTE ADULT - ASSESSMENT
This is a 60 y/o  male with PMH of HTN, HLD, c/o chest tightness on exertion, s/p cath 3/9/22 revealed + CAD This is a 62 y/o  male with PMH of HTN, HLD, c/o chest tightness on exertion, s/p cath 3/9/22 revealed + CAD  3/18    C2L    NL EF   H HTN    HLD  POST OP PAIN  requiring PCA  3/20   trf too floor   NSR      lop 50 q12  ator  and asa   81     OOB   amio 200 q12

## 2022-03-21 DIAGNOSIS — Z95.1 PRESENCE OF AORTOCORONARY BYPASS GRAFT: ICD-10-CM

## 2022-03-21 LAB
ANION GAP SERPL CALC-SCNC: 9 MMOL/L — SIGNIFICANT CHANGE UP (ref 5–17)
BUN SERPL-MCNC: 18 MG/DL — SIGNIFICANT CHANGE UP (ref 7–23)
CALCIUM SERPL-MCNC: 8.7 MG/DL — SIGNIFICANT CHANGE UP (ref 8.4–10.5)
CHLORIDE SERPL-SCNC: 101 MMOL/L — SIGNIFICANT CHANGE UP (ref 96–108)
CO2 SERPL-SCNC: 28 MMOL/L — SIGNIFICANT CHANGE UP (ref 22–31)
CREAT SERPL-MCNC: 0.64 MG/DL — SIGNIFICANT CHANGE UP (ref 0.5–1.3)
EGFR: 108 ML/MIN/1.73M2 — SIGNIFICANT CHANGE UP
GLUCOSE BLDC GLUCOMTR-MCNC: 121 MG/DL — HIGH (ref 70–99)
GLUCOSE BLDC GLUCOMTR-MCNC: 146 MG/DL — HIGH (ref 70–99)
GLUCOSE SERPL-MCNC: 125 MG/DL — HIGH (ref 70–99)
HCT VFR BLD CALC: 30.2 % — LOW (ref 39–50)
HGB BLD-MCNC: 9.9 G/DL — LOW (ref 13–17)
MAGNESIUM SERPL-MCNC: 2.2 MG/DL — SIGNIFICANT CHANGE UP (ref 1.6–2.6)
MCHC RBC-ENTMCNC: 30.2 PG — SIGNIFICANT CHANGE UP (ref 27–34)
MCHC RBC-ENTMCNC: 32.8 GM/DL — SIGNIFICANT CHANGE UP (ref 32–36)
MCV RBC AUTO: 92.1 FL — SIGNIFICANT CHANGE UP (ref 80–100)
NRBC # BLD: 0 /100 WBCS — SIGNIFICANT CHANGE UP (ref 0–0)
PHOSPHATE SERPL-MCNC: 1.2 MG/DL — LOW (ref 2.5–4.5)
PLATELET # BLD AUTO: 176 K/UL — SIGNIFICANT CHANGE UP (ref 150–400)
POTASSIUM SERPL-MCNC: 4.9 MMOL/L — SIGNIFICANT CHANGE UP (ref 3.5–5.3)
POTASSIUM SERPL-SCNC: 4.9 MMOL/L — SIGNIFICANT CHANGE UP (ref 3.5–5.3)
RBC # BLD: 3.28 M/UL — LOW (ref 4.2–5.8)
RBC # FLD: 12.8 % — SIGNIFICANT CHANGE UP (ref 10.3–14.5)
SODIUM SERPL-SCNC: 138 MMOL/L — SIGNIFICANT CHANGE UP (ref 135–145)
WBC # BLD: 17.13 K/UL — HIGH (ref 3.8–10.5)
WBC # FLD AUTO: 17.13 K/UL — HIGH (ref 3.8–10.5)

## 2022-03-21 PROCEDURE — 71045 X-RAY EXAM CHEST 1 VIEW: CPT | Mod: 26

## 2022-03-21 RX ORDER — OXYCODONE HYDROCHLORIDE 5 MG/1
10 TABLET ORAL EVERY 4 HOURS
Refills: 0 | Status: DISCONTINUED | OUTPATIENT
Start: 2022-03-21 | End: 2022-03-23

## 2022-03-21 RX ORDER — OXYCODONE HYDROCHLORIDE 5 MG/1
5 TABLET ORAL
Refills: 0 | Status: DISCONTINUED | OUTPATIENT
Start: 2022-03-21 | End: 2022-03-23

## 2022-03-21 RX ORDER — METOPROLOL TARTRATE 50 MG
100 TABLET ORAL
Refills: 0 | Status: DISCONTINUED | OUTPATIENT
Start: 2022-03-21 | End: 2022-03-23

## 2022-03-21 RX ORDER — METOPROLOL TARTRATE 50 MG
100 TABLET ORAL DAILY
Refills: 0 | Status: DISCONTINUED | OUTPATIENT
Start: 2022-03-21 | End: 2022-03-21

## 2022-03-21 RX ADMIN — ATORVASTATIN CALCIUM 80 MILLIGRAM(S): 80 TABLET, FILM COATED ORAL at 21:22

## 2022-03-21 RX ADMIN — GABAPENTIN 100 MILLIGRAM(S): 400 CAPSULE ORAL at 05:08

## 2022-03-21 RX ADMIN — Medication 81 MILLIGRAM(S): at 11:48

## 2022-03-21 RX ADMIN — Medication 2: at 11:43

## 2022-03-21 RX ADMIN — SODIUM CHLORIDE 3 MILLILITER(S): 9 INJECTION INTRAMUSCULAR; INTRAVENOUS; SUBCUTANEOUS at 14:22

## 2022-03-21 RX ADMIN — POLYETHYLENE GLYCOL 3350 17 GRAM(S): 17 POWDER, FOR SOLUTION ORAL at 12:26

## 2022-03-21 RX ADMIN — SODIUM CHLORIDE 3 MILLILITER(S): 9 INJECTION INTRAMUSCULAR; INTRAVENOUS; SUBCUTANEOUS at 05:15

## 2022-03-21 RX ADMIN — OXYCODONE HYDROCHLORIDE 5 MILLIGRAM(S): 5 TABLET ORAL at 10:55

## 2022-03-21 RX ADMIN — OXYCODONE HYDROCHLORIDE 5 MILLIGRAM(S): 5 TABLET ORAL at 19:29

## 2022-03-21 RX ADMIN — PANTOPRAZOLE SODIUM 40 MILLIGRAM(S): 20 TABLET, DELAYED RELEASE ORAL at 05:15

## 2022-03-21 RX ADMIN — ENOXAPARIN SODIUM 40 MILLIGRAM(S): 100 INJECTION SUBCUTANEOUS at 11:48

## 2022-03-21 RX ADMIN — CHLORHEXIDINE GLUCONATE 1 APPLICATION(S): 213 SOLUTION TOPICAL at 12:26

## 2022-03-21 RX ADMIN — Medication 650 MILLIGRAM(S): at 00:01

## 2022-03-21 RX ADMIN — Medication 2: at 07:50

## 2022-03-21 RX ADMIN — SODIUM CHLORIDE 3 MILLILITER(S): 9 INJECTION INTRAMUSCULAR; INTRAVENOUS; SUBCUTANEOUS at 22:12

## 2022-03-21 RX ADMIN — Medication 100 MILLIGRAM(S): at 12:30

## 2022-03-21 RX ADMIN — Medication 650 MILLIGRAM(S): at 00:32

## 2022-03-21 RX ADMIN — GABAPENTIN 100 MILLIGRAM(S): 400 CAPSULE ORAL at 21:22

## 2022-03-21 RX ADMIN — Medication 500 MILLIGRAM(S): at 17:00

## 2022-03-21 RX ADMIN — Medication 50 MILLIGRAM(S): at 05:08

## 2022-03-21 RX ADMIN — OXYCODONE HYDROCHLORIDE 5 MILLIGRAM(S): 5 TABLET ORAL at 11:30

## 2022-03-21 RX ADMIN — Medication 500 MILLIGRAM(S): at 05:08

## 2022-03-21 RX ADMIN — AMIODARONE HYDROCHLORIDE 400 MILLIGRAM(S): 400 TABLET ORAL at 05:08

## 2022-03-21 RX ADMIN — OXYCODONE HYDROCHLORIDE 5 MILLIGRAM(S): 5 TABLET ORAL at 20:00

## 2022-03-21 RX ADMIN — Medication 650 MILLIGRAM(S): at 12:25

## 2022-03-21 RX ADMIN — GABAPENTIN 100 MILLIGRAM(S): 400 CAPSULE ORAL at 12:30

## 2022-03-21 RX ADMIN — Medication 650 MILLIGRAM(S): at 05:14

## 2022-03-21 RX ADMIN — SENNA PLUS 2 TABLET(S): 8.6 TABLET ORAL at 21:22

## 2022-03-21 RX ADMIN — Medication 650 MILLIGRAM(S): at 11:48

## 2022-03-21 RX ADMIN — HYDROMORPHONE HYDROCHLORIDE 30 MILLILITER(S): 2 INJECTION INTRAMUSCULAR; INTRAVENOUS; SUBCUTANEOUS at 06:53

## 2022-03-21 RX ADMIN — Medication 650 MILLIGRAM(S): at 05:08

## 2022-03-21 NOTE — PROGRESS NOTE ADULT - ASSESSMENT
This is a 62 y/o  male with PMH of HTN, HLD, c/o chest tightness on exertion, s/p cath 3/9/22 revealed + CAD  3/18    C2L    NL EF   H HTN    HLD  POST OP PAIN  requiring PCA  3/20   trf too floor   NSR      lop 50 q12  ator  and asa   81     OOB   amio 200 q12 This is a 60 y/o  male with PMH of HTN, HLD, c/o chest tightness on exertion, s/p cath 3/9/22 revealed + CAD  3/18    C2L    NL EF   H HTN    HLD  POST OP PAIN  requiring PCA  3/20   trf too floor   NSR      lop 50 q12  ator  and asa   81     OOB   ERP   3/21 VSS: RSR 70-80; d/w pw this am; change lopressor to toprol 100   discharge planning- home tue/ wed

## 2022-03-21 NOTE — PROGRESS NOTE ADULT - PROBLEM SELECTOR PLAN 1
sp    CABG  asa      ator   lop 50q12  amio 200q12 continue postop care  continue asa/ statin  change lopressor to toprol 100 qd as per Dr. Em  d/c pw this am  pulm toilet  pain management  increase activity as tolerated  Discharge planning- home tue/ wed as per Dr. Em

## 2022-03-21 NOTE — PROGRESS NOTE ADULT - SUBJECTIVE AND OBJECTIVE BOX
Sitting on chair at bedside, reporting of mild lower back spasms, he is aware PCA will likely not relieve back discomfort  Otherwise he was in no distress, seen eating breakfast

## 2022-03-21 NOTE — PROGRESS NOTE ADULT - SUBJECTIVE AND OBJECTIVE BOX
VITAL SIGNS    Telemetry:    Vital Signs Last 24 Hrs  T(C): 36.7 (22 @ 08:25), Max: 37.2 (22 @ 04:23)  T(F): 98 (22 @ 08:25), Max: 98.9 (22 @ 04:23)  HR: 82 (22 @ 08:25) (77 - 98)  BP: 137/79 (22 @ 08:25) (96/56 - 137/81)  RR: 18 (22 @ 08:25) (18 - 34)  SpO2: 96% (22 @ 08:25) (93% - 99%)             @ 07:01  -   @ 07:00  --------------------------------------------------------  IN: 2159.5 mL / OUT: 1790 mL / NET: 369.5 mL     @ 07:01  -   @ 09:10  --------------------------------------------------------  IN: 30 mL / OUT: 0 mL / NET: 30 mL       Daily     Daily Weight in k.9 (21 Mar 2022 08:26)  Admit Wt: Drug Dosing Weight  Height (cm): 172.7 (15 Mar 2022 14:02)  Weight (kg): 87.2 (18 Mar 2022 09:10)  BMI (kg/m2): 29.2 (18 Mar 2022 09:10)  BSA (m2): 2.01 (18 Mar 2022 09:10)      CAPILLARY BLOOD GLUCOSE  137 (20 Mar 2022 16:00)  141 (20 Mar 2022 12:00)      POCT Blood Glucose.: 121 mg/dL (21 Mar 2022 07:24)  POCT Blood Glucose.: 144 mg/dL (20 Mar 2022 21:23)  POCT Blood Glucose.: 137 mg/dL (20 Mar 2022 16:20)  POCT Blood Glucose.: 144 mg/dL (20 Mar 2022 11:51)          acetaminophen     Tablet .. 650 milliGRAM(s) Oral every 6 hours  acetaminophen     Tablet .. 650 milliGRAM(s) Oral every 6 hours PRN  ascorbic acid 500 milliGRAM(s) Oral two times a day  aspirin enteric coated 81 milliGRAM(s) Oral daily  atorvastatin 80 milliGRAM(s) Oral at bedtime  bisacodyl Suppository 10 milliGRAM(s) Rectal once  chlorhexidine 2% Cloths 1 Application(s) Topical daily  enoxaparin Injectable 40 milliGRAM(s) SubCutaneous every 24 hours  gabapentin 100 milliGRAM(s) Oral every 8 hours  HYDROmorphone PCA (1 mG/mL) Rescue Clinician Bolus 0.5 milliGRAM(s) IV Push every 15 minutes PRN  insulin lispro (ADMELOG) corrective regimen sliding scale   SubCutaneous three times a day before meals  insulin lispro (ADMELOG) corrective regimen sliding scale   SubCutaneous at bedtime  metoprolol succinate  milliGRAM(s) Oral daily  oxyCODONE    IR 5 milliGRAM(s) Oral every 3 hours PRN  oxyCODONE    IR 10 milliGRAM(s) Oral every 4 hours PRN  pantoprazole    Tablet 40 milliGRAM(s) Oral before breakfast  polyethylene glycol 3350 17 Gram(s) Oral daily  senna 2 Tablet(s) Oral at bedtime  sodium chloride 0.9% lock flush 3 milliLiter(s) IV Push every 8 hours  sodium chloride 0.9%. 1000 milliLiter(s) IV Continuous <Continuous>  traZODone 50 milliGRAM(s) Oral at bedtime PRN      PHYSICAL EXAM    Subjective: "Hi.   Neurology: alert and oriented x 3, nonfocal, no gross deficits  CV : tele:  RSR  Sternal Wound :  CDI with dressing , Stable  Lungs: clear. RR easy, unlabored   Abdomen: soft, nontender, nondistended, positive bowel sounds, bowel movement   Neg N/V/D   :  pt voiding without difficulty   Extremities:   MAYES; edema, neg calf tenderness.   PPP bilaterally      PW:  Chest tubes:                 VITAL SIGNS    Telemetry:  rsr 70-80   Vital Signs Last 24 Hrs  T(C): 36.7 (22 @ 08:25), Max: 37.2 (22 @ 04:23)  T(F): 98 (22 @ 08:25), Max: 98.9 (22 @ 04:23)  HR: 82 (22 @ 08:25) (77 - 98)  BP: 137/79 (22 @ 08:25) (96/56 - 137/81)  RR: 18 (22 @ 08:25) (18 - 34)  SpO2: 96% (22 @ 08:25) (93% - 99%)             @ 07:01  -   @ 07:00  --------------------------------------------------------  IN: 2159.5 mL / OUT: 1790 mL / NET: 369.5 mL     @ 07:01  -   @ 09:10  --------------------------------------------------------  IN: 30 mL / OUT: 0 mL / NET: 30 mL       Daily     Daily Weight in k.9 (21 Mar 2022 08:26)  Admit Wt: Drug Dosing Weight  Height (cm): 172.7 (15 Mar 2022 14:02)  Weight (kg): 87.2 (18 Mar 2022 09:10)  BMI (kg/m2): 29.2 (18 Mar 2022 09:10)  BSA (m2): 2.01 (18 Mar 2022 09:10)      CAPILLARY BLOOD GLUCOSE  137 (20 Mar 2022 16:00)  141 (20 Mar 2022 12:00)      POCT Blood Glucose.: 121 mg/dL (21 Mar 2022 07:24)  POCT Blood Glucose.: 144 mg/dL (20 Mar 2022 21:23)  POCT Blood Glucose.: 137 mg/dL (20 Mar 2022 16:20)  POCT Blood Glucose.: 144 mg/dL (20 Mar 2022 11:51)          acetaminophen     Tablet .. 650 milliGRAM(s) Oral every 6 hours  acetaminophen     Tablet .. 650 milliGRAM(s) Oral every 6 hours PRN  ascorbic acid 500 milliGRAM(s) Oral two times a day  aspirin enteric coated 81 milliGRAM(s) Oral daily  atorvastatin 80 milliGRAM(s) Oral at bedtime  bisacodyl Suppository 10 milliGRAM(s) Rectal once  chlorhexidine 2% Cloths 1 Application(s) Topical daily  enoxaparin Injectable 40 milliGRAM(s) SubCutaneous every 24 hours  gabapentin 100 milliGRAM(s) Oral every 8 hours  HYDROmorphone PCA (1 mG/mL) Rescue Clinician Bolus 0.5 milliGRAM(s) IV Push every 15 minutes PRN  insulin lispro (ADMELOG) corrective regimen sliding scale   SubCutaneous three times a day before meals  insulin lispro (ADMELOG) corrective regimen sliding scale   SubCutaneous at bedtime  metoprolol succinate  milliGRAM(s) Oral daily  oxyCODONE    IR 5 milliGRAM(s) Oral every 3 hours PRN  oxyCODONE    IR 10 milliGRAM(s) Oral every 4 hours PRN  pantoprazole    Tablet 40 milliGRAM(s) Oral before breakfast  polyethylene glycol 3350 17 Gram(s) Oral daily  senna 2 Tablet(s) Oral at bedtime  sodium chloride 0.9% lock flush 3 milliLiter(s) IV Push every 8 hours  sodium chloride 0.9%. 1000 milliLiter(s) IV Continuous <Continuous>  traZODone 50 milliGRAM(s) Oral at bedtime PRN      PHYSICAL EXAM    Subjective: "I feel ok."   Neurology: alert and oriented x 3, nonfocal, no gross deficits  CV : tele:  RSR 70-80  Sternal Wound :  CDI with dressing , Stable  Lungs: clear. RR easy, unlabored   Abdomen: soft, nontender, nondistended, positive bowel sounds, neg bowel movement   Neg N/V/D   :  pt voiding without difficulty   Extremities:   MAYES; neg LE edema, neg calf tenderness.   PPP bilaterally ; left leg w/ AB      PW:  Chest tubes:                 VITAL SIGNS    Telemetry:  rsr 70-80   Vital Signs Last 24 Hrs  T(C): 36.7 (22 @ 08:25), Max: 37.2 (22 @ 04:23)  T(F): 98 (22 @ 08:25), Max: 98.9 (22 @ 04:23)  HR: 82 (22 @ 08:25) (77 - 98)  BP: 137/79 (22 @ 08:25) (96/56 - 137/81)  RR: 18 (22 @ 08:25) (18 - 34)  SpO2: 96% (22 @ 08:25) (93% - 99%)             @ 07:01  -   @ 07:00  --------------------------------------------------------  IN: 2159.5 mL / OUT: 1790 mL / NET: 369.5 mL     @ 07:01  -   @ 09:10  --------------------------------------------------------  IN: 30 mL / OUT: 0 mL / NET: 30 mL       Daily     Daily Weight in k.9 (21 Mar 2022 08:26)  Admit Wt: Drug Dosing Weight  Height (cm): 172.7 (15 Mar 2022 14:02)  Weight (kg): 87.2 (18 Mar 2022 09:10)  BMI (kg/m2): 29.2 (18 Mar 2022 09:10)  BSA (m2): 2.01 (18 Mar 2022 09:10)      CAPILLARY BLOOD GLUCOSE  137 (20 Mar 2022 16:00)  141 (20 Mar 2022 12:00)      POCT Blood Glucose.: 121 mg/dL (21 Mar 2022 07:24)  POCT Blood Glucose.: 144 mg/dL (20 Mar 2022 21:23)  POCT Blood Glucose.: 137 mg/dL (20 Mar 2022 16:20)  POCT Blood Glucose.: 144 mg/dL (20 Mar 2022 11:51)          acetaminophen     Tablet .. 650 milliGRAM(s) Oral every 6 hours  acetaminophen     Tablet .. 650 milliGRAM(s) Oral every 6 hours PRN  ascorbic acid 500 milliGRAM(s) Oral two times a day  aspirin enteric coated 81 milliGRAM(s) Oral daily  atorvastatin 80 milliGRAM(s) Oral at bedtime  bisacodyl Suppository 10 milliGRAM(s) Rectal once  chlorhexidine 2% Cloths 1 Application(s) Topical daily  enoxaparin Injectable 40 milliGRAM(s) SubCutaneous every 24 hours  gabapentin 100 milliGRAM(s) Oral every 8 hours  HYDROmorphone PCA (1 mG/mL) Rescue Clinician Bolus 0.5 milliGRAM(s) IV Push every 15 minutes PRN  insulin lispro (ADMELOG) corrective regimen sliding scale   SubCutaneous three times a day before meals  insulin lispro (ADMELOG) corrective regimen sliding scale   SubCutaneous at bedtime  metoprolol succinate  milliGRAM(s) Oral daily  oxyCODONE    IR 5 milliGRAM(s) Oral every 3 hours PRN  oxyCODONE    IR 10 milliGRAM(s) Oral every 4 hours PRN  pantoprazole    Tablet 40 milliGRAM(s) Oral before breakfast  polyethylene glycol 3350 17 Gram(s) Oral daily  senna 2 Tablet(s) Oral at bedtime  sodium chloride 0.9% lock flush 3 milliLiter(s) IV Push every 8 hours  sodium chloride 0.9%. 1000 milliLiter(s) IV Continuous <Continuous>  traZODone 50 milliGRAM(s) Oral at bedtime PRN      PHYSICAL EXAM      PHYSICAL EXAM    Subjective: "I feel ok."   Neurology: alert and oriented x 3, nonfocal, no gross deficits  CV : tele:  RSR    Sternal Wound :  CDI PRISCILA- sternum stable   Lungs: clear. RR easy, unlabored   Abdomen: soft, nontender, nondistended, positive bowel sounds, neg  bowel movement   Neg N/V/D   :  pt voiding without difficulty   Extremities:   MAYES; neg LE edema, neg calf tenderness.   PPP bilaterally; left SVG w/ AB     PW: +pw- d/c this am   Chest tubes: none

## 2022-03-21 NOTE — PROGRESS NOTE ADULT - ASSESSMENT
Pain Management Attending Addendum    SUBJECTIVE: Patient doing well with IV PCA    Therapy:    [X] IV PCA         [ ] PRN Analgesics    OBJECTIVE:   [X] Pain appropriately controlled    [ ] Other:    Side Effects:  [X] None	             [ ] Nausea              [ ] Pruritis                	[ ] Other:    ASSESSMENT/PLAN: Continue current therapy    Would recommend flexeril 5 mg PO TID or 1 gram of robaxin TID for lower back spasms as long as patient is mentating appropriately and is aware that these agents can cause drowsiness/dizziness    Avoid if patient is taking another MAO inhibitor: isocarboxazid, linezolid, metaxalone, methylene blue, moclobemide, phenelzine, procarbazine, rasagiline, safinamide, selegiline, tranylcypromine  Also avoid if patient is in heart block post CABG

## 2022-03-21 NOTE — PROGRESS NOTE ADULT - SUBJECTIVE AND OBJECTIVE BOX
Day 1 of Anesthesia Pain Management Service    SUBJECTIVE: Doing well except for the muscle knot in my back    Pain Scale Score:	[X] Refer to charted pain scores    THERAPY:    [ ] IV PCA Morphine		        [ ] 5 mg/mL	[ ] 1 mg/mL  [X] IV PCA Hydromorphone	[ ] 5 mg/mL	[X] 1 mg/mL  [ ] IV PCA Fentanyl		        [ ] 50 micrograms/mL    Demand dose: 0.2 mg     Lockout: 6 minutes   Continuous Rate: 0 mg/hr  4 Hour Limit: 4 mg    MEDICATIONS  (STANDING):  acetaminophen     Tablet .. 650 milliGRAM(s) Oral every 6 hours  ascorbic acid 500 milliGRAM(s) Oral two times a day  aspirin enteric coated 81 milliGRAM(s) Oral daily  atorvastatin 80 milliGRAM(s) Oral at bedtime  bisacodyl Suppository 10 milliGRAM(s) Rectal once  chlorhexidine 2% Cloths 1 Application(s) Topical daily  enoxaparin Injectable 40 milliGRAM(s) SubCutaneous every 24 hours  gabapentin 100 milliGRAM(s) Oral every 8 hours  HYDROmorphone PCA (1 mG/mL) 30 milliLiter(s) PCA Continuous PCA Continuous  insulin lispro (ADMELOG) corrective regimen sliding scale   SubCutaneous three times a day before meals  insulin lispro (ADMELOG) corrective regimen sliding scale   SubCutaneous at bedtime  metoprolol tartrate 50 milliGRAM(s) Oral two times a day  pantoprazole    Tablet 40 milliGRAM(s) Oral before breakfast  polyethylene glycol 3350 17 Gram(s) Oral daily  senna 2 Tablet(s) Oral at bedtime  sodium chloride 0.9% lock flush 3 milliLiter(s) IV Push every 8 hours  sodium chloride 0.9%. 1000 milliLiter(s) (30 mL/Hr) IV Continuous <Continuous>    MEDICATIONS  (PRN):  acetaminophen     Tablet .. 650 milliGRAM(s) Oral every 6 hours PRN Mild Pain (1 - 3)  HYDROmorphone PCA (1 mG/mL) Rescue Clinician Bolus 0.5 milliGRAM(s) IV Push every 15 minutes PRN For pain scale greater than 6  traZODone 50 milliGRAM(s) Oral at bedtime PRN Sleep      OBJECTIVE:    Sedation Score:	[ X] Alert	 [ ] Drowsy 	[ ] Arousable	[ ] Asleep	[ ] Unresponsive    Side Effects:	[X ] None	[ ] Nausea	[ ] Vomiting	[ ] Pruritus  		[ ] Other:    Vital Signs Last 24 Hrs  T(C): 37.2 (21 Mar 2022 04:23), Max: 37.2 (21 Mar 2022 04:23)  T(F): 98.9 (21 Mar 2022 04:23), Max: 98.9 (21 Mar 2022 04:23)  HR: 98 (21 Mar 2022 04:23) (77 - 98)  BP: 113/58 (21 Mar 2022 04:23) (96/56 - 137/81)  BP(mean): 85 (20 Mar 2022 19:30) (69 - 86)  RR: 18 (21 Mar 2022 04:23) (18 - 34)  SpO2: 93% (21 Mar 2022 04:23) (91% - 99%)    ASSESSMENT/ PLAN    Therapy to  be:               [  ] Continued   [X ] Discontinued   [ X] Changed to PRN Analgesics    Documentation and Verification of current medications:   [X] Done	[ ] Not done, not eligible    Comments: OOB in chair eating breakfast. Endorsing muscle tightness in back. PCA use minimal for incisional pain. Plan to D\C PCA and transition to prn analgesics.  Recommend adding antispasmodic prn for back tightness.

## 2022-03-22 LAB
ANION GAP SERPL CALC-SCNC: 14 MMOL/L — SIGNIFICANT CHANGE UP (ref 5–17)
BUN SERPL-MCNC: 14 MG/DL — SIGNIFICANT CHANGE UP (ref 7–23)
CALCIUM SERPL-MCNC: 8.9 MG/DL — SIGNIFICANT CHANGE UP (ref 8.4–10.5)
CHLORIDE SERPL-SCNC: 101 MMOL/L — SIGNIFICANT CHANGE UP (ref 96–108)
CO2 SERPL-SCNC: 24 MMOL/L — SIGNIFICANT CHANGE UP (ref 22–31)
CREAT SERPL-MCNC: 0.56 MG/DL — SIGNIFICANT CHANGE UP (ref 0.5–1.3)
EGFR: 113 ML/MIN/1.73M2 — SIGNIFICANT CHANGE UP
GLUCOSE SERPL-MCNC: 112 MG/DL — HIGH (ref 70–99)
HCT VFR BLD CALC: 30.5 % — LOW (ref 39–50)
HGB BLD-MCNC: 10.1 G/DL — LOW (ref 13–17)
MAGNESIUM SERPL-MCNC: 2.1 MG/DL — SIGNIFICANT CHANGE UP (ref 1.6–2.6)
MCHC RBC-ENTMCNC: 30.1 PG — SIGNIFICANT CHANGE UP (ref 27–34)
MCHC RBC-ENTMCNC: 33.1 GM/DL — SIGNIFICANT CHANGE UP (ref 32–36)
MCV RBC AUTO: 91 FL — SIGNIFICANT CHANGE UP (ref 80–100)
NRBC # BLD: 0 /100 WBCS — SIGNIFICANT CHANGE UP (ref 0–0)
PHOSPHATE SERPL-MCNC: 2.1 MG/DL — LOW (ref 2.5–4.5)
PLATELET # BLD AUTO: 241 K/UL — SIGNIFICANT CHANGE UP (ref 150–400)
POTASSIUM SERPL-MCNC: 5.2 MMOL/L — SIGNIFICANT CHANGE UP (ref 3.5–5.3)
POTASSIUM SERPL-SCNC: 5.2 MMOL/L — SIGNIFICANT CHANGE UP (ref 3.5–5.3)
RBC # BLD: 3.35 M/UL — LOW (ref 4.2–5.8)
RBC # FLD: 13 % — SIGNIFICANT CHANGE UP (ref 10.3–14.5)
SODIUM SERPL-SCNC: 139 MMOL/L — SIGNIFICANT CHANGE UP (ref 135–145)
WBC # BLD: 15.22 K/UL — HIGH (ref 3.8–10.5)
WBC # FLD AUTO: 15.22 K/UL — HIGH (ref 3.8–10.5)

## 2022-03-22 RX ADMIN — OXYCODONE HYDROCHLORIDE 5 MILLIGRAM(S): 5 TABLET ORAL at 02:10

## 2022-03-22 RX ADMIN — OXYCODONE HYDROCHLORIDE 5 MILLIGRAM(S): 5 TABLET ORAL at 02:40

## 2022-03-22 RX ADMIN — GABAPENTIN 100 MILLIGRAM(S): 400 CAPSULE ORAL at 13:39

## 2022-03-22 RX ADMIN — ENOXAPARIN SODIUM 40 MILLIGRAM(S): 100 INJECTION SUBCUTANEOUS at 12:24

## 2022-03-22 RX ADMIN — SODIUM CHLORIDE 3 MILLILITER(S): 9 INJECTION INTRAMUSCULAR; INTRAVENOUS; SUBCUTANEOUS at 13:39

## 2022-03-22 RX ADMIN — GABAPENTIN 100 MILLIGRAM(S): 400 CAPSULE ORAL at 21:52

## 2022-03-22 RX ADMIN — SODIUM CHLORIDE 3 MILLILITER(S): 9 INJECTION INTRAMUSCULAR; INTRAVENOUS; SUBCUTANEOUS at 22:27

## 2022-03-22 RX ADMIN — Medication 500 MILLIGRAM(S): at 05:00

## 2022-03-22 RX ADMIN — GABAPENTIN 100 MILLIGRAM(S): 400 CAPSULE ORAL at 05:00

## 2022-03-22 RX ADMIN — Medication 81 MILLIGRAM(S): at 12:24

## 2022-03-22 RX ADMIN — SODIUM CHLORIDE 3 MILLILITER(S): 9 INJECTION INTRAMUSCULAR; INTRAVENOUS; SUBCUTANEOUS at 05:11

## 2022-03-22 RX ADMIN — ATORVASTATIN CALCIUM 80 MILLIGRAM(S): 80 TABLET, FILM COATED ORAL at 21:53

## 2022-03-22 RX ADMIN — OXYCODONE HYDROCHLORIDE 5 MILLIGRAM(S): 5 TABLET ORAL at 21:20

## 2022-03-22 RX ADMIN — Medication 100 MILLIGRAM(S): at 12:24

## 2022-03-22 RX ADMIN — Medication 500 MILLIGRAM(S): at 18:05

## 2022-03-22 RX ADMIN — PANTOPRAZOLE SODIUM 40 MILLIGRAM(S): 20 TABLET, DELAYED RELEASE ORAL at 05:11

## 2022-03-22 RX ADMIN — OXYCODONE HYDROCHLORIDE 5 MILLIGRAM(S): 5 TABLET ORAL at 20:49

## 2022-03-22 NOTE — PROGRESS NOTE ADULT - PROVIDER SPECIALTY LIST ADULT
Anesthesia
CT Surgery
CTU
Anesthesia
Critical Care
Critical Care
CT Surgery
CT Surgery

## 2022-03-22 NOTE — PROGRESS NOTE ADULT - PROBLEM SELECTOR PLAN 1
continue postop care  continue asa/ statin  change lopressor to toprol 100 qd as per Dr. Em  d/c pw this am  pulm toilet  pain management  increase activity as tolerated  Discharge planning- home tue/ wed as per Dr. Em continue postop care  continue asa/ statin  coninue toprol 100 qd  pulm toilet  pain management  increase activity as tolerated  Discharge planning- home wed as per Dr. Em

## 2022-03-22 NOTE — PROGRESS NOTE ADULT - SUBJECTIVE AND OBJECTIVE BOX
VITAL SIGNS    Telemetry:    Vital Signs Last 24 Hrs  T(C): 36.9 (22 @ 05:05), Max: 36.9 (22 @ 05:05)  T(F): 98.4 (22 @ 05:05), Max: 98.4 (22 @ 05:05)  HR: 94 (22 @ 05:05) (92 - 101)  BP: 146/94 (22 @ 05:05) (134/78 - 146/94)  RR: 18 (22 @ 05:05) (18 - 18)  SpO2: 94% (22 @ 05:05) (90% - 94%)             @ 07:01  -   @ 07:00  --------------------------------------------------------  IN: 1310 mL / OUT: 3825 mL / NET: -2515 mL     @ 07:01  -   @ 09:55  --------------------------------------------------------  IN: 240 mL / OUT: 300 mL / NET: -60 mL       Daily     Daily Weight in k.4 (22 Mar 2022 07:43)  Admit Wt: Drug Dosing Weight  Height (cm): 172.7 (15 Mar 2022 14:02)  Weight (kg): 87.2 (18 Mar 2022 09:10)  BMI (kg/m2): 29.2 (18 Mar 2022 09:10)  BSA (m2): 2.01 (18 Mar 2022 09:10)      CAPILLARY BLOOD GLUCOSE      POCT Blood Glucose.: 146 mg/dL (21 Mar 2022 11:26)          acetaminophen     Tablet .. 650 milliGRAM(s) Oral every 6 hours PRN  ascorbic acid 500 milliGRAM(s) Oral two times a day  aspirin enteric coated 81 milliGRAM(s) Oral daily  atorvastatin 80 milliGRAM(s) Oral at bedtime  bisacodyl Suppository 10 milliGRAM(s) Rectal once  enoxaparin Injectable 40 milliGRAM(s) SubCutaneous every 24 hours  gabapentin 100 milliGRAM(s) Oral every 8 hours  metoprolol succinate  milliGRAM(s) Oral <User Schedule>  oxyCODONE    IR 5 milliGRAM(s) Oral every 3 hours PRN  oxyCODONE    IR 10 milliGRAM(s) Oral every 4 hours PRN  pantoprazole    Tablet 40 milliGRAM(s) Oral before breakfast  polyethylene glycol 3350 17 Gram(s) Oral daily  senna 2 Tablet(s) Oral at bedtime  sodium chloride 0.9% lock flush 3 milliLiter(s) IV Push every 8 hours  traZODone 50 milliGRAM(s) Oral at bedtime PRN      PHYSICAL EXAM    Subjective: "Hi.   Neurology: alert and oriented x 3, nonfocal, no gross deficits  CV : tele:  RSR  Sternal Wound :  CDI with dressing , Stable  Lungs: clear. RR easy, unlabored   Abdomen: soft, nontender, nondistended, positive bowel sounds, bowel movement   Neg N/V/D   :  pt voiding without difficulty   Extremities:   MAYES; edema, neg calf tenderness.   PPP bilaterally      PW:  Chest tubes:                 VITAL SIGNS    Telemetry:  rsr    Vital Signs Last 24 Hrs  T(C): 36.9 (22 @ 05:05), Max: 36.9 (22 @ 05:05)  T(F): 98.4 (22 @ 05:05), Max: 98.4 (22 @ 05:05)  HR: 94 (22 @ 05:05) (92 - 101)  BP: 146/94 (22 @ 05:05) (134/78 - 146/94)  RR: 18 (22 @ 05:05) (18 - 18)  SpO2: 94% (22 @ 05:05) (90% - 94%)             @ 07:01  -   @ 07:00  --------------------------------------------------------  IN: 1310 mL / OUT: 3825 mL / NET: -2515 mL     @ 07:01  -   @ 09:55  --------------------------------------------------------  IN: 240 mL / OUT: 300 mL / NET: -60 mL       Daily     Daily Weight in k.4 (22 Mar 2022 07:43)  Admit Wt: Drug Dosing Weight  Height (cm): 172.7 (15 Mar 2022 14:02)  Weight (kg): 87.2 (18 Mar 2022 09:10)  BMI (kg/m2): 29.2 (18 Mar 2022 09:10)  BSA (m2): 2.01 (18 Mar 2022 09:10)      CAPILLARY BLOOD GLUCOSE      POCT Blood Glucose.: 146 mg/dL (21 Mar 2022 11:26)          acetaminophen     Tablet .. 650 milliGRAM(s) Oral every 6 hours PRN  ascorbic acid 500 milliGRAM(s) Oral two times a day  aspirin enteric coated 81 milliGRAM(s) Oral daily  atorvastatin 80 milliGRAM(s) Oral at bedtime  bisacodyl Suppository 10 milliGRAM(s) Rectal once  enoxaparin Injectable 40 milliGRAM(s) SubCutaneous every 24 hours  gabapentin 100 milliGRAM(s) Oral every 8 hours  metoprolol succinate  milliGRAM(s) Oral <User Schedule>  oxyCODONE    IR 5 milliGRAM(s) Oral every 3 hours PRN  oxyCODONE    IR 10 milliGRAM(s) Oral every 4 hours PRN  pantoprazole    Tablet 40 milliGRAM(s) Oral before breakfast  polyethylene glycol 3350 17 Gram(s) Oral daily  senna 2 Tablet(s) Oral at bedtime  sodium chloride 0.9% lock flush 3 milliLiter(s) IV Push every 8 hours  traZODone 50 milliGRAM(s) Oral at bedtime PRN        PHYSICAL EXAM    Subjective: "I feel ok."   Neurology: alert and oriented x 3, nonfocal, no gross deficits  CV : tele:  RSR    Sternal Wound :  CDI LIZA- sternum stable   Lungs: clear. RR easy, unlabored   Abdomen: soft, nontender, nondistended, positive bowel sounds, + bowel movement   Neg N/V/D   :  pt voiding without difficulty   Extremities:   MAYES; neg LE edema, neg calf tenderness.   PPP bilaterally; left SVG site cdi liza       PW: no  Chest tubes: none

## 2022-03-22 NOTE — SBIRT NOTE ADULT - NSSBIRTDRGBRIEFINTDET_GEN_A_CORE
Patient endorses ingesting 'shrooms twice within the last 12 months in social settings. Patient denies abuse and declines resources/referrals.

## 2022-03-22 NOTE — PROGRESS NOTE ADULT - ASSESSMENT
This is a 60 y/o  male with PMH of HTN, HLD, c/o chest tightness on exertion, s/p cath 3/9/22 revealed + CAD  3/18    C2L    NL EF   H HTN    HLD  POST OP PAIN  requiring PCA  3/20   trf too floor   NSR      lop 50 q12  ator  and asa   81     OOB   ERP   3/21 VSS: RSR 70-80; d/w pw this am; change lopressor to toprol 100   discharge planning- home tue/ wed  This is a 60 y/o  male with PMH of HTN, HLD, c/o chest tightness on exertion, s/p cath 3/9/22 revealed + CAD  3/18    C2L    NL EF   H HTN    HLD  POST OP PAIN  requiring PCA  3/20   trf too floor   NSR      lop 50 q12  ator  and asa   81     OOB   ERP   3/21 VSS: RSR 70-80; d/w pw this am; change lopressor to toprol 100   3/22 VSS; RSR ; pain management; pulm toilet; increase activity as tolerated   discharge planning- home wed

## 2022-03-23 ENCOUNTER — TRANSCRIPTION ENCOUNTER (OUTPATIENT)
Age: 62
End: 2022-03-23

## 2022-03-23 VITALS
HEART RATE: 121 BPM | RESPIRATION RATE: 18 BRPM | SYSTOLIC BLOOD PRESSURE: 102 MMHG | TEMPERATURE: 99 F | DIASTOLIC BLOOD PRESSURE: 68 MMHG | OXYGEN SATURATION: 97 %

## 2022-03-23 PROBLEM — E78.5 HYPERLIPIDEMIA, UNSPECIFIED: Chronic | Status: ACTIVE | Noted: 2022-03-15

## 2022-03-23 LAB
ANION GAP SERPL CALC-SCNC: 11 MMOL/L — SIGNIFICANT CHANGE UP (ref 5–17)
BUN SERPL-MCNC: 18 MG/DL — SIGNIFICANT CHANGE UP (ref 7–23)
CALCIUM SERPL-MCNC: 9.8 MG/DL — SIGNIFICANT CHANGE UP (ref 8.4–10.5)
CHLORIDE SERPL-SCNC: 100 MMOL/L — SIGNIFICANT CHANGE UP (ref 96–108)
CO2 SERPL-SCNC: 30 MMOL/L — SIGNIFICANT CHANGE UP (ref 22–31)
CREAT SERPL-MCNC: 0.76 MG/DL — SIGNIFICANT CHANGE UP (ref 0.5–1.3)
EGFR: 103 ML/MIN/1.73M2 — SIGNIFICANT CHANGE UP
GLUCOSE SERPL-MCNC: 125 MG/DL — HIGH (ref 70–99)
HCT VFR BLD CALC: 39.8 % — SIGNIFICANT CHANGE UP (ref 39–50)
HGB BLD-MCNC: 12.9 G/DL — LOW (ref 13–17)
MCHC RBC-ENTMCNC: 29.1 PG — SIGNIFICANT CHANGE UP (ref 27–34)
MCHC RBC-ENTMCNC: 32.4 GM/DL — SIGNIFICANT CHANGE UP (ref 32–36)
MCV RBC AUTO: 89.8 FL — SIGNIFICANT CHANGE UP (ref 80–100)
NRBC # BLD: 0 /100 WBCS — SIGNIFICANT CHANGE UP (ref 0–0)
PLATELET # BLD AUTO: 391 K/UL — SIGNIFICANT CHANGE UP (ref 150–400)
POTASSIUM SERPL-MCNC: 4.5 MMOL/L — SIGNIFICANT CHANGE UP (ref 3.5–5.3)
POTASSIUM SERPL-SCNC: 4.5 MMOL/L — SIGNIFICANT CHANGE UP (ref 3.5–5.3)
RBC # BLD: 4.43 M/UL — SIGNIFICANT CHANGE UP (ref 4.2–5.8)
RBC # FLD: 13.3 % — SIGNIFICANT CHANGE UP (ref 10.3–14.5)
SODIUM SERPL-SCNC: 141 MMOL/L — SIGNIFICANT CHANGE UP (ref 135–145)
WBC # BLD: 12.22 K/UL — HIGH (ref 3.8–10.5)
WBC # FLD AUTO: 12.22 K/UL — HIGH (ref 3.8–10.5)

## 2022-03-23 PROCEDURE — 85025 COMPLETE CBC W/AUTO DIFF WBC: CPT

## 2022-03-23 PROCEDURE — 71045 X-RAY EXAM CHEST 1 VIEW: CPT

## 2022-03-23 PROCEDURE — 80053 COMPREHEN METABOLIC PANEL: CPT

## 2022-03-23 PROCEDURE — 80048 BASIC METABOLIC PNL TOTAL CA: CPT

## 2022-03-23 PROCEDURE — 97530 THERAPEUTIC ACTIVITIES: CPT

## 2022-03-23 PROCEDURE — 85610 PROTHROMBIN TIME: CPT

## 2022-03-23 PROCEDURE — 85384 FIBRINOGEN ACTIVITY: CPT

## 2022-03-23 PROCEDURE — 36415 COLL VENOUS BLD VENIPUNCTURE: CPT

## 2022-03-23 PROCEDURE — 83735 ASSAY OF MAGNESIUM: CPT

## 2022-03-23 PROCEDURE — 82962 GLUCOSE BLOOD TEST: CPT

## 2022-03-23 PROCEDURE — 85730 THROMBOPLASTIN TIME PARTIAL: CPT

## 2022-03-23 PROCEDURE — C1751: CPT

## 2022-03-23 PROCEDURE — 84132 ASSAY OF SERUM POTASSIUM: CPT

## 2022-03-23 PROCEDURE — 97165 OT EVAL LOW COMPLEX 30 MIN: CPT

## 2022-03-23 PROCEDURE — 86891 AUTOLOGOUS BLOOD OP SALVAGE: CPT

## 2022-03-23 PROCEDURE — 84484 ASSAY OF TROPONIN QUANT: CPT

## 2022-03-23 PROCEDURE — 86900 BLOOD TYPING SEROLOGIC ABO: CPT

## 2022-03-23 PROCEDURE — P9045: CPT

## 2022-03-23 PROCEDURE — C1729: CPT

## 2022-03-23 PROCEDURE — 84443 ASSAY THYROID STIM HORMONE: CPT

## 2022-03-23 PROCEDURE — 86901 BLOOD TYPING SEROLOGIC RH(D): CPT

## 2022-03-23 PROCEDURE — C1769: CPT

## 2022-03-23 PROCEDURE — C1889: CPT

## 2022-03-23 PROCEDURE — 82330 ASSAY OF CALCIUM: CPT

## 2022-03-23 PROCEDURE — 82803 BLOOD GASES ANY COMBINATION: CPT

## 2022-03-23 PROCEDURE — 97116 GAIT TRAINING THERAPY: CPT

## 2022-03-23 PROCEDURE — 82565 ASSAY OF CREATININE: CPT

## 2022-03-23 PROCEDURE — 85027 COMPLETE CBC AUTOMATED: CPT

## 2022-03-23 PROCEDURE — 94002 VENT MGMT INPAT INIT DAY: CPT

## 2022-03-23 PROCEDURE — 85018 HEMOGLOBIN: CPT

## 2022-03-23 PROCEDURE — 85014 HEMATOCRIT: CPT

## 2022-03-23 PROCEDURE — 82435 ASSAY OF BLOOD CHLORIDE: CPT

## 2022-03-23 PROCEDURE — 97161 PT EVAL LOW COMPLEX 20 MIN: CPT

## 2022-03-23 PROCEDURE — 83605 ASSAY OF LACTIC ACID: CPT

## 2022-03-23 PROCEDURE — 84100 ASSAY OF PHOSPHORUS: CPT

## 2022-03-23 PROCEDURE — 84295 ASSAY OF SERUM SODIUM: CPT

## 2022-03-23 PROCEDURE — P9041: CPT

## 2022-03-23 PROCEDURE — 82550 ASSAY OF CK (CPK): CPT

## 2022-03-23 PROCEDURE — 86850 RBC ANTIBODY SCREEN: CPT

## 2022-03-23 PROCEDURE — 82553 CREATINE MB FRACTION: CPT

## 2022-03-23 PROCEDURE — 82947 ASSAY GLUCOSE BLOOD QUANT: CPT

## 2022-03-23 PROCEDURE — 93005 ELECTROCARDIOGRAM TRACING: CPT

## 2022-03-23 RX ORDER — POLYETHYLENE GLYCOL 3350 17 G/17G
17 POWDER, FOR SOLUTION ORAL
Qty: 0 | Refills: 0 | DISCHARGE
Start: 2022-03-23

## 2022-03-23 RX ORDER — AMLODIPINE BESYLATE 2.5 MG/1
1 TABLET ORAL
Qty: 0 | Refills: 0 | DISCHARGE

## 2022-03-23 RX ORDER — CHOLECALCIFEROL (VITAMIN D3) 125 MCG
0 CAPSULE ORAL
Qty: 0 | Refills: 0 | DISCHARGE

## 2022-03-23 RX ORDER — TELMISARTAN 20 MG/1
1 TABLET ORAL
Qty: 0 | Refills: 0 | DISCHARGE

## 2022-03-23 RX ORDER — METOPROLOL TARTRATE 50 MG
1 TABLET ORAL
Qty: 0 | Refills: 0 | DISCHARGE

## 2022-03-23 RX ORDER — FERROUS SULFATE 325(65) MG
1 TABLET ORAL
Qty: 0 | Refills: 0 | DISCHARGE

## 2022-03-23 RX ORDER — ASPIRIN/CALCIUM CARB/MAGNESIUM 324 MG
1 TABLET ORAL
Qty: 0 | Refills: 0 | DISCHARGE

## 2022-03-23 RX ORDER — ROSUVASTATIN CALCIUM 5 MG/1
1 TABLET ORAL
Qty: 0 | Refills: 0 | DISCHARGE

## 2022-03-23 RX ORDER — TRAZODONE HCL 50 MG
1 TABLET ORAL
Qty: 0 | Refills: 0 | DISCHARGE

## 2022-03-23 RX ORDER — ACETAMINOPHEN 500 MG
2 TABLET ORAL
Qty: 0 | Refills: 0 | DISCHARGE
Start: 2022-03-23

## 2022-03-23 RX ORDER — OXYCODONE HYDROCHLORIDE 5 MG/1
1 TABLET ORAL
Qty: 30 | Refills: 0
Start: 2022-03-23 | End: 2022-03-27

## 2022-03-23 RX ADMIN — ENOXAPARIN SODIUM 40 MILLIGRAM(S): 100 INJECTION SUBCUTANEOUS at 10:41

## 2022-03-23 RX ADMIN — Medication 100 MILLIGRAM(S): at 10:41

## 2022-03-23 RX ADMIN — SODIUM CHLORIDE 3 MILLILITER(S): 9 INJECTION INTRAMUSCULAR; INTRAVENOUS; SUBCUTANEOUS at 05:42

## 2022-03-23 RX ADMIN — SODIUM CHLORIDE 3 MILLILITER(S): 9 INJECTION INTRAMUSCULAR; INTRAVENOUS; SUBCUTANEOUS at 10:39

## 2022-03-23 RX ADMIN — Medication 500 MILLIGRAM(S): at 05:36

## 2022-03-23 RX ADMIN — PANTOPRAZOLE SODIUM 40 MILLIGRAM(S): 20 TABLET, DELAYED RELEASE ORAL at 05:42

## 2022-03-23 RX ADMIN — Medication 81 MILLIGRAM(S): at 10:41

## 2022-03-23 RX ADMIN — GABAPENTIN 100 MILLIGRAM(S): 400 CAPSULE ORAL at 05:35

## 2022-03-23 NOTE — DISCHARGE NOTE NURSING/CASE MANAGEMENT/SOCIAL WORK - NSSCNAMETXT_GEN_ALL_CORE
[facility], [phone].  Visiting nurse to call & arrange home care appointment for day after hospital discharge.

## 2022-03-23 NOTE — DISCHARGE NOTE NURSING/CASE MANAGEMENT/SOCIAL WORK - NSDCPEFALRISK_GEN_ALL_CORE
For information on Fall & Injury Prevention, visit: [email or web address] OR  [email or web address] OR  https://www.cdc.gov/steadi/patient.html

## 2022-03-23 NOTE — DISCHARGE NOTE PROVIDER - HOSPITAL COURSE
This is a 62 y/o  male with PMH of HTN, HLD, c/o chest tightness on exertion, s/p cath 3/9/22 revealed + CAD  3/18    C2L    NL EF   H HTN    HLD  POST OP PAIN  requiring PCA  3/20   trf too floor   NSR      lop 50 q12  ator  and asa   81     OOB   ERP   3/21 VSS: RSR 70-80; d/w pw this am; change lopressor to toprol 100   3/22 VSS; RSR ; pain management; pulm toilet; increase activity as tolerated   discharge planning- home wed

## 2022-03-23 NOTE — DISCHARGE NOTE PROVIDER - CARE PROVIDER_API CALL
Carlos Em)  Surgery; Thoracic and Cardiac Surgery  [address] [place] [address]  Phone: [phone]  Fax: [phone]  Follow Up Time:

## 2022-03-23 NOTE — DISCHARGE NOTE PROVIDER - NSDCMRMEDTOKEN_GEN_ALL_CORE_FT
amLODIPine 5 mg oral tablet: 1 tab(s) orally once a day  Aspir 81 oral delayed release tablet: 1 tab(s) orally once a day , last dose 3/11/22  Crestor 10 mg oral tablet: 1 tab(s) orally once a day  ferrous sulfate 324 mg (65 mg elemental iron) oral delayed release tablet: 1 tab(s) orally once a day  isosorbide mononitrate 30 mg oral tablet, extended release: 1 tab(s) orally once a day (in the morning)   Metoprolol Succinate ER 25 mg oral tablet, extended release: 1 tab(s) orally once a day  Micardis 40 mg oral tablet: 1 tab(s) orally once a day  traZODone 50 mg oral tablet: 1 tab(s) orally once a day (at bedtime), As Needed  Vitamin B-100 oral tablet: 1 tab(s) orally once a day  Vitamin D3: orally once a day   acetaminophen: 2 tab(s) orally every 6 hours, As Needed  amLODIPine 5 mg oral tablet: 1 tab(s) orally once a day  Aspir 81 oral delayed release tablet: 1 tab(s) orally once a day , last dose 3/11/22  Crestor 10 mg oral tablet: 1 tab(s) orally once a day  ferrous sulfate 324 mg (65 mg elemental iron) oral delayed release tablet: 1 tab(s) orally once a day  metoprolol succinate 100 mg oral tablet, extended release: 1 tab(s) orally once a day   oxyCODONE 5 mg oral tablet: 1 tab(s) orally every 4 hours, As Needed -Moderate Pain (4 - 6) MDD:8  polyethylene glycol 3350 oral powder for reconstitution: 17 gram(s) orally once a day, As Needed  traZODone 50 mg oral tablet: 1 tab(s) orally once a day (at bedtime), As Needed  Vitamin B-100 oral tablet: 1 tab(s) orally once a day  Vitamin D3: orally once a day

## 2022-03-23 NOTE — DISCHARGE NOTE NURSING/CASE MANAGEMENT/SOCIAL WORK - PATIENT PORTAL LINK FT
You can access the FollowMyHealth Patient Portal offered by [facility] by registering at the following website: [email or web address]. By joining [facility]’s FollowMyHealth portal, you will also be able to view your health information using other applications (apps) compatible with our system.

## 2022-03-24 ENCOUNTER — APPOINTMENT (OUTPATIENT)
Dept: CARE COORDINATION | Facility: HOME HEALTH | Age: 62
End: 2022-03-24
Payer: COMMERCIAL

## 2022-03-24 ENCOUNTER — TRANSCRIPTION ENCOUNTER (OUTPATIENT)
Age: 62
End: 2022-03-24

## 2022-03-24 PROCEDURE — 99024 POSTOP FOLLOW-UP VISIT: CPT

## 2022-03-24 RX ORDER — METOPROLOL TARTRATE 50 MG
1 TABLET ORAL
Qty: 30 | Refills: 0
Start: 2022-03-24 | End: 2022-04-22

## 2022-03-24 RX ORDER — AMLODIPINE BESYLATE 2.5 MG/1
1 TABLET ORAL
Qty: 0 | Refills: 0 | DISCHARGE
Start: 2022-03-24

## 2022-03-24 NOTE — ASSESSMENT
[FreeTextEntry1] : This is a 62 y/o M whom is s/p C2L seen to be recovering well, no post-op complaints. He has good family support and is currently staying w/ his daughter. He has not taken his weight today as he needs to ask his daughter where a scale is. He is aware to check wt daily in AM and report wt gain to [facility] team as instructed below in PLAN section. He had been taking Crestor 20mg prior to sx. DC paperwork states his dose in Crestor 10mg but he wasn't given a new Rx. Upon review of [place] the pt had been on Atorvastatin 80mg while inpt. Told pt to c/w Simvistatin 20mg at this time. He is aware to f/u w/ his PCP for further monitoring of his lipid panel outpt. Additonally he is aware to schedule an appt w/ his cardiologist, Dr. Nguyen.

## 2022-03-24 NOTE — REASON FOR VISIT
[Post Hospitalization] : a post hospitalization visit [FreeTextEntry1] : FOLLOW YOUR HEART - Transitional Care Management Program - [facility]

## 2022-03-24 NOTE — HISTORY OF PRESENT ILLNESS
[Home] : at home, [unfilled] , at the time of the visit. [Other Location: e.g. Home (Enter Location, City,State)___] : at [unfilled] [Verbal consent obtained from patient] : the patient, [unfilled] [FreeTextEntry1] : This is a 60 y/o male with PMH of HTN, HLD, c/o chest tightness on exertion, s/p cath 3/9/22 revealed + CAD. On 3/18 he had a C2L with an uncomplicated post-op course. He is seen via telehealth today stating he feels overall very well, has no post-op complaints. Pain is well controlled w/ Tylenol and occasionally Oxycodone. Emotional support and education provided. All questions answered.\par

## 2022-03-24 NOTE — PHYSICAL EXAM
[Neck Appearance] : the appearance of the neck was normal [] : no respiratory distress [Respiration, Rhythm And Depth] : normal respiratory rhythm and effort [Exaggerated Use Of Accessory Muscles For Inspiration] : no accessory muscle use [FreeTextEntry1] : MSI, CT sites and L leg SVG sites without erythema or drainage, with edges well approximated. BL LE - minimal edema. [Examination Of The Chest] : the chest was normal in appearance [Diminished Respiratory Excursion] : normal chest expansion [Chest Visual Inspection Thoracic Asymmetry] : no chest asymmetry

## 2022-03-27 ENCOUNTER — TRANSCRIPTION ENCOUNTER (OUTPATIENT)
Age: 62
End: 2022-03-27

## 2022-03-28 ENCOUNTER — TRANSCRIPTION ENCOUNTER (OUTPATIENT)
Age: 62
End: 2022-03-28

## 2022-03-30 PROBLEM — Z09 POSTOPERATIVE FOLLOW-UP: Status: ACTIVE | Noted: 2022-03-30

## 2022-03-30 PROBLEM — Z95.1 S/P CABG X 2: Status: ACTIVE | Noted: 2022-03-30

## 2022-03-30 RX ORDER — CYANOCOBALAMIN (VITAMIN B-12) 100 MCG
100 TABLET ORAL DAILY
Refills: 0 | Status: ACTIVE | COMMUNITY

## 2022-03-31 ENCOUNTER — TRANSCRIPTION ENCOUNTER (OUTPATIENT)
Age: 62
End: 2022-03-31

## 2022-04-01 ENCOUNTER — APPOINTMENT (OUTPATIENT)
Dept: CARDIOTHORACIC SURGERY | Facility: CLINIC | Age: 62
End: 2022-04-01
Payer: COMMERCIAL

## 2022-04-01 VITALS
SYSTOLIC BLOOD PRESSURE: 142 MMHG | TEMPERATURE: 98.7 F | OXYGEN SATURATION: 96 % | DIASTOLIC BLOOD PRESSURE: 92 MMHG | RESPIRATION RATE: 16 BRPM | HEART RATE: 96 BPM

## 2022-04-01 DIAGNOSIS — Z95.1 PRESENCE OF AORTOCORONARY BYPASS GRAFT: ICD-10-CM

## 2022-04-01 DIAGNOSIS — Z09 ENCOUNTER FOR FOLLOW-UP EXAMINATION AFTER COMPLETED TREATMENT FOR CONDITIONS OTHER THAN MALIGNANT NEOPLASM: ICD-10-CM

## 2022-04-01 PROCEDURE — 99024 POSTOP FOLLOW-UP VISIT: CPT

## 2022-04-01 RX ORDER — TELMISARTAN 40 MG/1
40 TABLET ORAL DAILY
Refills: 0 | Status: COMPLETED | COMMUNITY
End: 2022-04-01

## 2022-04-01 RX ORDER — LORATADINE 5 MG
17 TABLET,CHEWABLE ORAL
Qty: 1 | Refills: 0 | Status: COMPLETED | COMMUNITY
End: 2022-04-01

## 2022-04-01 RX ORDER — METOPROLOL SUCCINATE 100 MG/1
100 TABLET, EXTENDED RELEASE ORAL DAILY
Qty: 30 | Refills: 2 | Status: COMPLETED | COMMUNITY
Start: 2022-03-03 | End: 2022-04-01

## 2022-04-13 ENCOUNTER — APPOINTMENT (OUTPATIENT)
Dept: CARDIOLOGY | Facility: CLINIC | Age: 62
End: 2022-04-13
Payer: COMMERCIAL

## 2022-04-13 VITALS
SYSTOLIC BLOOD PRESSURE: 148 MMHG | OXYGEN SATURATION: 97 % | WEIGHT: 187 LBS | HEIGHT: 68 IN | HEART RATE: 81 BPM | TEMPERATURE: 98.8 F | BODY MASS INDEX: 28.34 KG/M2 | DIASTOLIC BLOOD PRESSURE: 82 MMHG

## 2022-04-13 VITALS — SYSTOLIC BLOOD PRESSURE: 122 MMHG | DIASTOLIC BLOOD PRESSURE: 70 MMHG

## 2022-04-13 PROCEDURE — 99214 OFFICE O/P EST MOD 30 MIN: CPT

## 2022-04-13 NOTE — DISCUSSION/SUMMARY
[FreeTextEntry1] : In a summary Ricardo Lucia is a middle aged male with CAD s/p CABG, continue Aspirin. Hypertension, controlled. Continue Amlodipine, Metoprolol   and 2 gm sodium diet. Hypercholesterolemia, Continue Crestor  and low cholesterol diet. LDL goal less than 70 g/dL  Continue healthy lifestyle. Follow up in 4 months.

## 2022-04-13 NOTE — HISTORY OF PRESENT ILLNESS
[FreeTextEntry1] : Ricardo Lucia is a 61 year old male with history of CAD s/p CABG in 3/2022 , hypertension and hypercholesterolemia comes for follow up visit. Chest tightness and shortness of breath on exertion resolved after CABG.  No palpitations. Compliant to medications and diet. Started walking every day.

## 2022-04-13 NOTE — REASON FOR VISIT
[Hyperlipidemia] : hyperlipidemia [Hypertension] : hypertension [Coronary Artery Disease] : coronary artery disease [Other: ____] : [unfilled] [FreeTextEntry3] : Dr. Isabel

## 2022-04-21 ENCOUNTER — TRANSCRIPTION ENCOUNTER (OUTPATIENT)
Age: 62
End: 2022-04-21

## 2022-06-09 NOTE — H&P CARDIOLOGY - NS MD HP PULSE DORSALIS
Stable normal affect/normal behavior right normal/left normal right 1+, left nonpalpable - doppler only

## 2022-08-24 ENCOUNTER — NON-APPOINTMENT (OUTPATIENT)
Age: 62
End: 2022-08-24

## 2022-08-24 ENCOUNTER — APPOINTMENT (OUTPATIENT)
Dept: CARDIOLOGY | Facility: CLINIC | Age: 62
End: 2022-08-24

## 2022-08-24 VITALS
BODY MASS INDEX: 29.7 KG/M2 | OXYGEN SATURATION: 97 % | WEIGHT: 196 LBS | HEART RATE: 73 BPM | HEIGHT: 68 IN | DIASTOLIC BLOOD PRESSURE: 82 MMHG | SYSTOLIC BLOOD PRESSURE: 138 MMHG

## 2022-08-24 PROCEDURE — 93000 ELECTROCARDIOGRAM COMPLETE: CPT

## 2022-08-24 PROCEDURE — 99214 OFFICE O/P EST MOD 30 MIN: CPT | Mod: 25

## 2022-08-24 NOTE — DISCUSSION/SUMMARY
[FreeTextEntry1] : In a summary Ricardo Lucia is a middle aged male with CAD s/p CABG, continue Aspirin. Hypertension, controlled. Continue Amlodipine, Metoprolol   and 2 gm sodium diet. Hypercholesterolemia, Continue Crestor  and low cholesterol diet. LDL goal less than 70 g/dL  Continue healthy lifestyle. Follow up in 6 months.

## 2022-08-24 NOTE — REVIEW OF SYSTEMS
[Negative] : Respiratory [Blurry Vision] : no blurred vision [Dyspnea on exertion] : not dyspnea during exertion [Chest Discomfort] : no chest discomfort [Lower Ext Edema] : no extremity edema [Palpitations] : no palpitations [Orthopnea] : no orthopnea [PND] : no PND [Abdominal Pain] : no abdominal pain [Nausea] : no nausea [Vomiting] : no vomiting [Heartburn] : no heartburn [Joint Pain] : no joint pain [Rash] : no rash [Itching] : no itching [Dizziness] : no dizziness [Tremor] : no tremor was seen [Numbness (Hypoesthesia)] : no numbness [Tingling (Paresthesia)] : no tingling [Confusion] : no confusion was observed [Under Stress] : not under stress [Easy Bleeding] : no tendency for easy bleeding [Easy Bruising] : no tendency for easy bruising

## 2022-08-24 NOTE — REASON FOR VISIT
[Hyperlipidemia] : hyperlipidemia [Hypertension] : hypertension [Coronary Artery Disease] : coronary artery disease [FreeTextEntry3] : Dr. Isabel

## 2022-08-24 NOTE — HISTORY OF PRESENT ILLNESS
[FreeTextEntry1] : Ricardo Lucia is a 61 year old male with history of CAD s/p CABG in 3/2022 , hypertension and hypercholesterolemia comes for follow up visit. Denies any chest pain or shortness of breath on exertion.  No palpitations. Compliant to medications and diet. Had COVID couple of months ago.

## 2023-03-01 ENCOUNTER — APPOINTMENT (OUTPATIENT)
Dept: CARDIOLOGY | Facility: CLINIC | Age: 63
End: 2023-03-01
Payer: COMMERCIAL

## 2023-03-01 ENCOUNTER — NON-APPOINTMENT (OUTPATIENT)
Age: 63
End: 2023-03-01

## 2023-03-01 VITALS
HEIGHT: 68 IN | SYSTOLIC BLOOD PRESSURE: 128 MMHG | HEART RATE: 73 BPM | BODY MASS INDEX: 31.52 KG/M2 | OXYGEN SATURATION: 98 % | WEIGHT: 208 LBS | DIASTOLIC BLOOD PRESSURE: 82 MMHG

## 2023-03-01 DIAGNOSIS — Z86.16 PERSONAL HISTORY OF COVID-19: ICD-10-CM

## 2023-03-01 PROCEDURE — 93000 ELECTROCARDIOGRAM COMPLETE: CPT

## 2023-03-01 PROCEDURE — 99214 OFFICE O/P EST MOD 30 MIN: CPT | Mod: 25

## 2023-03-01 PROCEDURE — 93306 TTE W/DOPPLER COMPLETE: CPT

## 2023-03-01 NOTE — HISTORY OF PRESENT ILLNESS
[FreeTextEntry1] : Ricardo Lucia is a 62 year old male with history of CAD s/p CABG in 3/2022 , hypertension and hypercholesterolemia comes for follow up visit. Denies any chest pain or shortness of breath on exertion.  No palpitations. Compliant to medications and diet.

## 2023-03-01 NOTE — DISCUSSION/SUMMARY
[FreeTextEntry1] : In a summary Ricardo Lucia is a middle aged male with CAD s/p CABG, continue Aspirin. Hypertension, controlled. Continue Amlodipine, Metoprolol   and 2 gm sodium diet. Hypercholesterolemia, Continue Crestor  and low cholesterol diet. LDL goal less than 70 g/dL  Echo done showed normal LV systolic function. Echo results explained. Continue healthy lifestyle. Follow up in 6 months.

## 2023-08-23 NOTE — H&P PST ADULT - FALL HARM RISK - UNIVERSAL INTERVENTIONS
Bed in lowest position, wheels locked, appropriate side rails in place/Call bell, personal items and telephone in reach/Instruct patient to call for assistance before getting out of bed or chair/Non-slip footwear when patient is out of bed/[place] to call system/Physically safe environment - no spills, clutter or unnecessary equipment/Purposeful Proactive Rounding/Room/bathroom lighting operational, light cord in reach Tranexamic Acid Counseling:  Patient advised of the small risk of bleeding problems with tranexamic acid. They were also instructed to call if they developed any nausea, vomiting or diarrhea. All of the patient's questions and concerns were addressed.

## 2023-09-12 ENCOUNTER — NON-APPOINTMENT (OUTPATIENT)
Age: 63
End: 2023-09-12

## 2023-09-12 ENCOUNTER — APPOINTMENT (OUTPATIENT)
Dept: CARDIOLOGY | Facility: CLINIC | Age: 63
End: 2023-09-12
Payer: COMMERCIAL

## 2023-09-12 VITALS
HEIGHT: 68 IN | RESPIRATION RATE: 12 BRPM | WEIGHT: 205 LBS | DIASTOLIC BLOOD PRESSURE: 76 MMHG | SYSTOLIC BLOOD PRESSURE: 116 MMHG | TEMPERATURE: 98.2 F | BODY MASS INDEX: 31.07 KG/M2 | HEART RATE: 76 BPM | OXYGEN SATURATION: 97 %

## 2023-09-12 VITALS
BODY MASS INDEX: 31.07 KG/M2 | HEART RATE: 76 BPM | HEIGHT: 68 IN | SYSTOLIC BLOOD PRESSURE: 116 MMHG | RESPIRATION RATE: 16 BRPM | WEIGHT: 205 LBS | TEMPERATURE: 98.2 F | OXYGEN SATURATION: 97 % | DIASTOLIC BLOOD PRESSURE: 76 MMHG

## 2023-09-12 DIAGNOSIS — R94.39 ABNORMAL RESULT OF OTHER CARDIOVASCULAR FUNCTION STUDY: ICD-10-CM

## 2023-09-12 PROCEDURE — 93000 ELECTROCARDIOGRAM COMPLETE: CPT

## 2023-09-12 PROCEDURE — 99214 OFFICE O/P EST MOD 30 MIN: CPT | Mod: 25

## 2023-09-12 RX ORDER — OXYCODONE 5 MG/1
5 TABLET ORAL EVERY 6 HOURS
Qty: 20 | Refills: 0 | Status: DISCONTINUED | COMMUNITY
End: 2023-09-12

## 2024-03-12 ENCOUNTER — APPOINTMENT (OUTPATIENT)
Dept: CARDIOLOGY | Facility: CLINIC | Age: 64
End: 2024-03-12
Payer: COMMERCIAL

## 2024-03-12 ENCOUNTER — NON-APPOINTMENT (OUTPATIENT)
Age: 64
End: 2024-03-12

## 2024-03-12 VITALS
WEIGHT: 315 LBS | BODY MASS INDEX: 47.74 KG/M2 | HEIGHT: 68 IN | HEART RATE: 67 BPM | SYSTOLIC BLOOD PRESSURE: 122 MMHG | DIASTOLIC BLOOD PRESSURE: 70 MMHG | TEMPERATURE: 98.5 F | OXYGEN SATURATION: 97 %

## 2024-03-12 DIAGNOSIS — R06.02 SHORTNESS OF BREATH: ICD-10-CM

## 2024-03-12 DIAGNOSIS — I10 ESSENTIAL (PRIMARY) HYPERTENSION: ICD-10-CM

## 2024-03-12 DIAGNOSIS — E78.00 PURE HYPERCHOLESTEROLEMIA, UNSPECIFIED: ICD-10-CM

## 2024-03-12 DIAGNOSIS — I25.10 ATHEROSCLEROTIC HEART DISEASE OF NATIVE CORONARY ARTERY W/OUT ANGINA PECTORIS: ICD-10-CM

## 2024-03-12 PROCEDURE — 93000 ELECTROCARDIOGRAM COMPLETE: CPT

## 2024-03-12 PROCEDURE — 99214 OFFICE O/P EST MOD 30 MIN: CPT | Mod: 25

## 2024-03-12 RX ORDER — METOPROLOL SUCCINATE 100 MG/1
100 TABLET, EXTENDED RELEASE ORAL
Qty: 90 | Refills: 1 | Status: ACTIVE | COMMUNITY
Start: 1900-01-01 | End: 1900-01-01

## 2024-03-12 NOTE — REVIEW OF SYSTEMS
[Blurry Vision] : no blurred vision [Dyspnea on exertion] : dyspnea during exertion [Lower Ext Edema] : no extremity edema [Chest Discomfort] : no chest discomfort [Palpitations] : no palpitations [Orthopnea] : no orthopnea [PND] : no PND [Abdominal Pain] : no abdominal pain [Vomiting] : no vomiting [Nausea] : no nausea [Heartburn] : no heartburn [Rash] : no rash [Itching] : no itching [Joint Pain] : no joint pain [Dizziness] : no dizziness [Tremor] : no tremor was seen [Numbness (Hypoesthesia)] : no numbness [Confusion] : no confusion was observed [Tingling (Paresthesia)] : no tingling [Easy Bleeding] : no tendency for easy bleeding [Under Stress] : not under stress [Negative] : Constitutional [Easy Bruising] : no tendency for easy bruising

## 2024-03-12 NOTE — DISCUSSION/SUMMARY
[FreeTextEntry1] : In a summary Ricardo Lucia is a middle-aged male with CAD s/p CABG, continue Aspirin. Hypertension, controlled. Continue Amlodipine, Metoprolol   and 2 gm sodium diet. Hypercholesterolemia, Continue Crestor and low cholesterol diet. LDL goal less than 70 g/dL. Shortness of breath on exertion and CAD, will get Echo to assess LV systolic function and wall motion. Further plan based on Echo results. Continue healthy lifestyle. Follow up in 6 months.

## 2024-03-12 NOTE — REASON FOR VISIT
[Hyperlipidemia] : hyperlipidemia [Hypertension] : hypertension [FreeTextEntry3] : Dr. Isabel [Coronary Artery Disease] : coronary artery disease

## 2024-03-12 NOTE — HISTORY OF PRESENT ILLNESS
[FreeTextEntry1] : Ricardo Lucia is a 63- year- old male with history of CAD s/p CABG in 3/2022, hypertension and hypercholesterolemia comes for follow up visit. Denies any chest pain. Mild shortness of breath on exertion which is relieved with rest in few minutes.  No palpitations. Compliant to medications and diet.

## 2024-03-12 NOTE — PHYSICAL EXAM
[No Carotid Bruit] : no carotid bruit [Normal Conjunctiva] : normal conjunctiva [Normal Bowel Sounds] : normal bowel sounds [Non Tender] : non-tender [Soft] : abdomen soft [No Edema] : no edema [No Cyanosis] : no cyanosis [Normal] : alert and oriented, normal memory

## 2024-03-21 RX ORDER — AMLODIPINE BESYLATE 5 MG/1
5 TABLET ORAL
Qty: 90 | Refills: 1 | Status: ACTIVE | COMMUNITY
Start: 2018-02-08 | End: 1900-01-01

## 2024-06-10 NOTE — DISCUSSION/SUMMARY
LVM to return call to reschedule MWM appointment with a different provider due to LESLIE Lucio leaving practice   [FreeTextEntry1] : In a summary  ChemoyayoRicardo cortez is a middle aged male with hypertension, usually controlled. Continue current medications and 2 gm sodium diet. Hypercholesterolemia, on statins and stopped taking 2 weeks ago, low cholesterol diet. LDL goal less than 130 g/dL. Follow up in 6 months. Healthy lifestyle.

## 2024-09-11 ENCOUNTER — APPOINTMENT (OUTPATIENT)
Dept: CARDIOLOGY | Facility: CLINIC | Age: 64
End: 2024-09-11
Payer: COMMERCIAL

## 2024-09-11 ENCOUNTER — NON-APPOINTMENT (OUTPATIENT)
Age: 64
End: 2024-09-11

## 2024-09-11 VITALS
HEIGHT: 68 IN | SYSTOLIC BLOOD PRESSURE: 136 MMHG | TEMPERATURE: 98.3 F | RESPIRATION RATE: 12 BRPM | OXYGEN SATURATION: 96 % | WEIGHT: 204 LBS | HEART RATE: 71 BPM | BODY MASS INDEX: 30.92 KG/M2 | DIASTOLIC BLOOD PRESSURE: 76 MMHG

## 2024-09-11 DIAGNOSIS — E78.00 PURE HYPERCHOLESTEROLEMIA, UNSPECIFIED: ICD-10-CM

## 2024-09-11 DIAGNOSIS — I25.10 ATHEROSCLEROTIC HEART DISEASE OF NATIVE CORONARY ARTERY W/OUT ANGINA PECTORIS: ICD-10-CM

## 2024-09-11 DIAGNOSIS — I10 ESSENTIAL (PRIMARY) HYPERTENSION: ICD-10-CM

## 2024-09-11 PROCEDURE — 93306 TTE W/DOPPLER COMPLETE: CPT

## 2024-09-11 PROCEDURE — 99214 OFFICE O/P EST MOD 30 MIN: CPT | Mod: 25

## 2024-09-11 PROCEDURE — 93000 ELECTROCARDIOGRAM COMPLETE: CPT

## 2024-09-11 NOTE — DISCUSSION/SUMMARY
[FreeTextEntry1] : In a summary Ricardo Lucia is a middle-aged male with CAD s/p CABG, continue Aspirin. Hypertension, controlled. Continue Amlodipine, Metoprolol   and 2 gm sodium diet. Hypercholesterolemia, Continue Crestor and low cholesterol diet. LDL goal less than 70 g/dL. Hypertension and CAD, Echo done showed normal LV systolic function and wall motion. Continue healthy lifestyle. Follow up in 6 months.

## 2024-09-11 NOTE — HISTORY OF PRESENT ILLNESS
[FreeTextEntry1] : Ricardo Lucia is a 63- year- old male with history of CAD s/p CABG in 3/2022, hypertension and hypercholesterolemia comes for follow up visit. Denies any chest pain or shortness of breath.  No palpitations. Compliant to medications and diet.

## 2024-09-16 ENCOUNTER — RX RENEWAL (OUTPATIENT)
Age: 64
End: 2024-09-16

## 2025-03-12 ENCOUNTER — APPOINTMENT (OUTPATIENT)
Dept: CARDIOLOGY | Facility: CLINIC | Age: 65
End: 2025-03-12
Payer: COMMERCIAL

## 2025-03-12 ENCOUNTER — NON-APPOINTMENT (OUTPATIENT)
Age: 65
End: 2025-03-12

## 2025-03-12 VITALS
TEMPERATURE: 99 F | BODY MASS INDEX: 30.92 KG/M2 | WEIGHT: 204 LBS | SYSTOLIC BLOOD PRESSURE: 139 MMHG | RESPIRATION RATE: 15 BRPM | OXYGEN SATURATION: 99 % | HEIGHT: 68 IN | DIASTOLIC BLOOD PRESSURE: 86 MMHG | HEART RATE: 88 BPM

## 2025-03-12 DIAGNOSIS — E78.00 PURE HYPERCHOLESTEROLEMIA, UNSPECIFIED: ICD-10-CM

## 2025-03-12 DIAGNOSIS — I25.10 ATHEROSCLEROTIC HEART DISEASE OF NATIVE CORONARY ARTERY W/OUT ANGINA PECTORIS: ICD-10-CM

## 2025-03-12 DIAGNOSIS — I10 ESSENTIAL (PRIMARY) HYPERTENSION: ICD-10-CM

## 2025-03-12 PROCEDURE — 93000 ELECTROCARDIOGRAM COMPLETE: CPT

## 2025-03-12 PROCEDURE — 99214 OFFICE O/P EST MOD 30 MIN: CPT | Mod: 25

## 2025-03-17 ENCOUNTER — RX RENEWAL (OUTPATIENT)
Age: 65
End: 2025-03-17

## 2025-03-26 ENCOUNTER — RX RENEWAL (OUTPATIENT)
Age: 65
End: 2025-03-26

## 2025-04-29 NOTE — REVIEW OF SYSTEMS
Fluid total post procedure: 900 mL [Negative] : Neurological [Shortness Of Breath] : no shortness of breath [Chest Pain] : no chest pain [Lower Ext Edema] : no extremity edema [Easy Bleeding] : no tendency for easy bleeding [Palpitations] : no palpitations [Easy Bruising] : no tendency for easy bruising

## 2025-06-16 NOTE — REASON FOR VISIT
Detail Level: Zone Detail Level: Detailed [Follow-Up - Clinic] : a clinic follow-up of [Hyperlipidemia] : hyperlipidemia [Hypertension] : hypertension

## 2025-09-15 ENCOUNTER — NON-APPOINTMENT (OUTPATIENT)
Age: 65
End: 2025-09-15

## 2025-09-17 ENCOUNTER — APPOINTMENT (OUTPATIENT)
Dept: CARDIOLOGY | Facility: CLINIC | Age: 65
End: 2025-09-17
Payer: COMMERCIAL

## 2025-09-17 VITALS
TEMPERATURE: 96 F | HEART RATE: 88 BPM | WEIGHT: 204 LBS | RESPIRATION RATE: 15 BRPM | OXYGEN SATURATION: 99 % | SYSTOLIC BLOOD PRESSURE: 127 MMHG | DIASTOLIC BLOOD PRESSURE: 85 MMHG | HEIGHT: 68 IN | BODY MASS INDEX: 30.92 KG/M2

## 2025-09-17 DIAGNOSIS — I25.10 ATHEROSCLEROTIC HEART DISEASE OF NATIVE CORONARY ARTERY W/OUT ANGINA PECTORIS: ICD-10-CM

## 2025-09-17 DIAGNOSIS — I10 ESSENTIAL (PRIMARY) HYPERTENSION: ICD-10-CM

## 2025-09-17 DIAGNOSIS — E78.00 PURE HYPERCHOLESTEROLEMIA, UNSPECIFIED: ICD-10-CM

## 2025-09-17 PROCEDURE — 93000 ELECTROCARDIOGRAM COMPLETE: CPT

## 2025-09-17 PROCEDURE — 99214 OFFICE O/P EST MOD 30 MIN: CPT | Mod: 25

## 2025-09-17 PROCEDURE — 93306 TTE W/DOPPLER COMPLETE: CPT

## (undated) DEVICE — SUT SOFSILK 0 18" TIES

## (undated) DEVICE — TUBING SUCTION NON CONDUCTIVE 316X18" STERILE

## (undated) DEVICE — CONNECTOR "Y" 3/8 X 1/4 X 3/8"

## (undated) DEVICE — DRSG DERMABOND PRINEO 22CM

## (undated) DEVICE — DRAIN PLEUROVAC

## (undated) DEVICE — SUT PROLENE 8-0 24" BV175-6

## (undated) DEVICE — SAW BLADE SYNVASIVE OSCILLATING

## (undated) DEVICE — GLV 8.5 PROTEXIS (WHITE)

## (undated) DEVICE — DRAIN DRAINGE CHEST DRY ADL DUAL

## (undated) DEVICE — SUCTION YANKAUER BULBOUS TIP NO VENT

## (undated) DEVICE — SUT PLEDGET SOFT LARGE 3/8" X 3/16" X 1/16" X6

## (undated) DEVICE — BLADE SCALPEL SAFETYLOCK #15

## (undated) DEVICE — SOL IRR BAG NS 0.9% 3000ML

## (undated) DEVICE — Device

## (undated) DEVICE — VESSEL LOOP MAXI-RED  0.120" X 16"

## (undated) DEVICE — SYR LUER LOK 50CC

## (undated) DEVICE — DRAPE MAYO STAND 30"

## (undated) DEVICE — DRAPE IOBAN 33" X 23"

## (undated) DEVICE — POSITIONER FOAM EGG CRATE ULNAR 2PCS (PINK)

## (undated) DEVICE — STABILIZER HAND ASSISTANT ATTACHMENT W STABLESOFT 2S

## (undated) DEVICE — SAW BLADE MICROAIRE OSCILLATING LG 0.9X64X33.5MM

## (undated) DEVICE — GOWN SLEEVES

## (undated) DEVICE — SUT SILK 2-0 18" SH (POP-OFF)

## (undated) DEVICE — NDL HYPO SAFE 18G X 1.5" (PINK)

## (undated) DEVICE — SUT PROLENE 6-0 30" C-1

## (undated) DEVICE — MEDTRONIC CLEARVIEW BLOWER MISTER KIT W TUBING SET

## (undated) DEVICE — DRAPE 1/2 SHEET 40X57"

## (undated) DEVICE — SYR LUER LOK 20CC

## (undated) DEVICE — MEDTRONIC URCHIN EVO HEART POSITIONER & CANISTER TUBING SET

## (undated) DEVICE — PACK UNIVERSAL CARDIAC

## (undated) DEVICE — BEAVER BLADE MINI SHARP ALL ROUND (BLUE)

## (undated) DEVICE — SUT VICRYL 3-0 27" CT-1

## (undated) DEVICE — SOL IRR POUR H2O 250ML

## (undated) DEVICE — SUT VICRYL 0 36" CTX UNDYED

## (undated) DEVICE — GOWN TRIMAX LG

## (undated) DEVICE — DRSG STERISTRIPS 0.5 X 4"

## (undated) DEVICE — TOURNIQUET SET 12FR (1 RED, 1 BLUE, 1 SNARE) 7"

## (undated) DEVICE — NDL COUNTER FOAM AND MAGNET 40-70

## (undated) DEVICE — VESSEL LOOP KEY SURG MAXI BLUE 2.4X1.2MM

## (undated) DEVICE — GETINGE VASOVIEW 7 ENDOSCOPIC VESSEL HARVESTING SYSTEM

## (undated) DEVICE — DRAIN RESERVOIR FOR JACKSON PRATT 100CC CARDINAL

## (undated) DEVICE — SYS VEIN HARVESTING VIRTUOSAPH PLUS W/ RADIAL

## (undated) DEVICE — GOWN TRIMAX XXL

## (undated) DEVICE — CHEST DRAIN OASIS DRY SUCTION WATER SEAL

## (undated) DEVICE — CANISTER SUCTION 2000CC

## (undated) DEVICE — SUMP PERICARDIAL 20FR 1/4" ADULT

## (undated) DEVICE — DRAPE TOWEL BLUE 17" X 24"

## (undated) DEVICE — SUT BOOT STANDARD (YELLOW) 5 PAIR

## (undated) DEVICE — DRSG DERMABOND PRINEO 60CM

## (undated) DEVICE — PACK CARDIAC YELLOW

## (undated) DEVICE — SUT SOFSILK 0 30" TIES

## (undated) DEVICE — SUT MONOCRYL 4-0 18" PS-2

## (undated) DEVICE — WARMING BLANKET FULL ADULT

## (undated) DEVICE — SUT STAINLESS STEEL 5 18" CCS

## (undated) DEVICE — DRSG OPSITE 13.75 X 4"

## (undated) DEVICE — DRSG KLING 6"

## (undated) DEVICE — GOWN LG

## (undated) DEVICE — DRAIN CHANNEL 32FR ROUND HUBLESS FULL FLUTED

## (undated) DEVICE — DRSG ACE BANDAGE 6"

## (undated) DEVICE — GLV 7 PROTEXIS (WHITE)

## (undated) DEVICE — DRAPE 3/4 SHEET W REINFORCEMENT 56X77"

## (undated) DEVICE — SOL NORMOSOL-R PH7.4 1000ML

## (undated) DEVICE — TISSUE STABILIZER MEDTRONIC OCTOPUS EVOLUTION

## (undated) DEVICE — GLV 6.5 PROTEXIS (WHITE)

## (undated) DEVICE — TUBING TRUWAVE PRESSURE MALE/FEMALE 72"

## (undated) DEVICE — AORTIC PUNCH 4.0MM LONG LENGTH HANDLE

## (undated) DEVICE — SYR LUER LOK 30CC

## (undated) DEVICE — SUT SILK 5-0 60" TIES

## (undated) DEVICE — TUBING SUCTION 20FT

## (undated) DEVICE — SUT VICRYL 1 36" CTX UNDYED

## (undated) DEVICE — DRSG OPSITE 2.5 X 2"

## (undated) DEVICE — BLADE SCALPEL SAFETYLOCK #10

## (undated) DEVICE — LAP PAD 18 X 18"

## (undated) DEVICE — SUT PROLENE 4-0 30" SH-1

## (undated) DEVICE — FILTER REINFUSION FOR SALVAGED BLOOD DISP

## (undated) DEVICE — STAPLER SKIN VISI-STAT 35 WIDE

## (undated) DEVICE — TUBING IV SET MICROCLAVE ADAPTER

## (undated) DEVICE — BLADE SCALPEL SAFETYLOCK #11

## (undated) DEVICE — CONNECTOR STRAIGHT 3/8 X 3/8"

## (undated) DEVICE — FOLEY TRAY 16FR 5CC LF LUBRISIL ADVANCE TEMP CLOSED

## (undated) DEVICE — SYR LUER LOK 3CC

## (undated) DEVICE — SUT ETHIBOND 1 30" CT-1

## (undated) DEVICE — HEMOCONCENTRATOR PERFUSION ID TUBING 1/4"

## (undated) DEVICE — SPECIMEN CONTAINER 100ML

## (undated) DEVICE — DRSG TEGADERM 6"X8"

## (undated) DEVICE — SUT DOUBLE 6 WIRE STERNAL

## (undated) DEVICE — SOL IRR POUR NS 0.9% 500ML

## (undated) DEVICE — CONNECTOR STRAIGHT 3/8 X 1/2"

## (undated) DEVICE — POSITIONER CARDIAC BUMP

## (undated) DEVICE — CONNECTOR CARDIAC 1:1 FOR HUBLESS DRAINS

## (undated) DEVICE — SYNOVIS VASCULAR PROBE 1.5MM 15CM

## (undated) DEVICE — FEEDING TUBE NG SUMP 16FR 48"

## (undated) DEVICE — GLV 8 PROTEXIS (WHITE)

## (undated) DEVICE — SAW BLADE MICROAIRE STERNUM 1X34X9.4MM

## (undated) DEVICE — TUBING KIT FAST START ATF 40

## (undated) DEVICE — SUT PROLENE 7-0 24" BV-1

## (undated) DEVICE — STOPCOCK 4-WAY (BLUE) DISCOFIX SPIN-LOCK CONNECTOR

## (undated) DEVICE — PACING CABLE (BROWN) A/V TEMP SCREW DOWN 12FT

## (undated) DEVICE — PACK CUSTOM W/INSPIRE OXYGENATOR

## (undated) DEVICE — BULLDOG SPRING CLIP LATIS/LATIS 6MM 1/2 FORCE (BLUE)

## (undated) DEVICE — PREP CHLORAPREP HI-LITE ORANGE 26ML

## (undated) DEVICE — GLV 7.5 PROTEXIS (WHITE)